# Patient Record
Sex: MALE | Race: BLACK OR AFRICAN AMERICAN | NOT HISPANIC OR LATINO | Employment: FULL TIME | URBAN - METROPOLITAN AREA
[De-identification: names, ages, dates, MRNs, and addresses within clinical notes are randomized per-mention and may not be internally consistent; named-entity substitution may affect disease eponyms.]

---

## 2018-05-02 ENCOUNTER — HISTORICAL (OUTPATIENT)
Dept: ADMINISTRATIVE | Facility: HOSPITAL | Age: 58
End: 2018-05-02

## 2018-05-02 LAB
ABS NEUT (OLG): 2.89 X10(3)/MCL (ref 2.1–9.2)
ALBUMIN SERPL-MCNC: 3.6 GM/DL (ref 3.4–5)
ALBUMIN/GLOB SERPL: 1 RATIO (ref 1–2)
ALP SERPL-CCNC: 56 UNIT/L (ref 45–117)
ALT SERPL-CCNC: 48 UNIT/L (ref 12–78)
AST SERPL-CCNC: 19 UNIT/L (ref 15–37)
BASOPHILS # BLD AUTO: 0.04 X10(3)/MCL
BASOPHILS NFR BLD AUTO: 1 %
BILIRUB SERPL-MCNC: 0.3 MG/DL (ref 0.2–1)
BILIRUBIN DIRECT+TOT PNL SERPL-MCNC: 0.1 MG/DL
BILIRUBIN DIRECT+TOT PNL SERPL-MCNC: 0.2 MG/DL
BUN SERPL-MCNC: 18 MG/DL (ref 7–18)
CALCIUM SERPL-MCNC: 8.8 MG/DL (ref 8.5–10.1)
CHLORIDE SERPL-SCNC: 113 MMOL/L (ref 98–107)
CHOLEST SERPL-MCNC: 113 MG/DL
CHOLEST/HDLC SERPL: 1.9 {RATIO} (ref 0–5)
CO2 SERPL-SCNC: 29 MMOL/L (ref 21–32)
CREAT SERPL-MCNC: 1.1 MG/DL (ref 0.6–1.3)
CREAT UR-MCNC: 120 MG/DL
EOSINOPHIL # BLD AUTO: 0.35 X10(3)/MCL
EOSINOPHIL NFR BLD AUTO: 6 %
ERYTHROCYTE [DISTWIDTH] IN BLOOD BY AUTOMATED COUNT: 14.4 % (ref 11.5–14.5)
EST. AVERAGE GLUCOSE BLD GHB EST-MCNC: 108 MG/DL
GLOBULIN SER-MCNC: 3.6 GM/ML (ref 2.3–3.5)
GLUCOSE SERPL-MCNC: 82 MG/DL (ref 74–106)
HBA1C MFR BLD: 5.4 % (ref 4.2–6.3)
HCT VFR BLD AUTO: 43.7 % (ref 40–51)
HDLC SERPL-MCNC: 58 MG/DL
HGB BLD-MCNC: 16.2 GM/DL (ref 13.5–17.5)
LDLC SERPL CALC-MCNC: 39 MG/DL (ref 0–130)
LYMPHOCYTES # BLD AUTO: 2.4 X10(3)/MCL
LYMPHOCYTES NFR BLD AUTO: 38 % (ref 13–40)
MCH RBC QN AUTO: 30.3 PG (ref 26–34)
MCHC RBC AUTO-ENTMCNC: 37.1 GM/DL (ref 31–37)
MCV RBC AUTO: 81.8 FL (ref 80–100)
MICROALBUMIN UR-MCNC: <5 MG/L (ref 0–19)
MICROALBUMIN/CREAT RATIO PNL UR: NORMAL MCG/MG CR (ref 0–29)
MONOCYTES # BLD AUTO: 0.69 X10(3)/MCL
MONOCYTES NFR BLD AUTO: 11 % (ref 4–12)
NEUTROPHILS # BLD AUTO: 2.89 X10(3)/MCL
NEUTROPHILS NFR BLD AUTO: 45 X10(3)/MCL
PLATELET # BLD AUTO: 247 X10(3)/MCL (ref 130–400)
PMV BLD AUTO: 10.2 FL (ref 7.4–10.4)
POTASSIUM SERPL-SCNC: 4.1 MMOL/L (ref 3.5–5.1)
PROT SERPL-MCNC: 7.2 GM/DL (ref 6.4–8.2)
RBC # BLD AUTO: 5.34 X10(6)/MCL (ref 4.5–5.9)
SODIUM SERPL-SCNC: 144 MMOL/L (ref 136–145)
T4 FREE SERPL-MCNC: 0.79 NG/DL (ref 0.76–1.46)
TRIGL SERPL-MCNC: 81 MG/DL
TSH SERPL-ACNC: 0.67 MIU/L (ref 0.36–3.74)
VLDLC SERPL CALC-MCNC: 16 MG/DL
WBC # SPEC AUTO: 6.4 X10(3)/MCL (ref 4.5–11)

## 2019-01-18 ENCOUNTER — HISTORICAL (OUTPATIENT)
Dept: FAMILY MEDICINE | Facility: CLINIC | Age: 59
End: 2019-01-18

## 2019-01-18 LAB
ALBUMIN SERPL-MCNC: 4 GM/DL (ref 3.4–5)
ALBUMIN/GLOB SERPL: 1.03 RATIO (ref 1.1–2)
ALP SERPL-CCNC: 53 UNIT/L (ref 45–117)
ALT SERPL-CCNC: 69 UNIT/L (ref 12–78)
AST SERPL-CCNC: 33 UNIT/L (ref 15–37)
BILIRUB SERPL-MCNC: 0.3 MG/DL (ref 0.2–1)
BILIRUBIN DIRECT+TOT PNL SERPL-MCNC: 0.1 MG/DL
BILIRUBIN DIRECT+TOT PNL SERPL-MCNC: 0.2 MG/DL
BUN SERPL-MCNC: 11 MG/DL (ref 7–18)
CALCIUM SERPL-MCNC: 8.9 MG/DL (ref 8.5–10.1)
CHLORIDE SERPL-SCNC: 107 MMOL/L (ref 98–107)
CO2 SERPL-SCNC: 32 MMOL/L (ref 21–32)
CREAT SERPL-MCNC: 0.8 MG/DL (ref 0.6–1.3)
GLOBULIN SER-MCNC: 3.9 GM/ML (ref 2.3–3.5)
GLUCOSE SERPL-MCNC: 88 MG/DL (ref 74–106)
POTASSIUM SERPL-SCNC: 4.3 MMOL/L (ref 3.5–5.1)
PROT SERPL-MCNC: 7.9 GM/DL (ref 6.4–8.2)
SODIUM SERPL-SCNC: 140 MMOL/L (ref 136–145)

## 2019-06-03 ENCOUNTER — HISTORICAL (OUTPATIENT)
Dept: ADMINISTRATIVE | Facility: HOSPITAL | Age: 59
End: 2019-06-03

## 2019-06-03 LAB
ABS NEUT (OLG): 3.04 X10(3)/MCL (ref 2.1–9.2)
ALBUMIN SERPL-MCNC: 3.8 GM/DL (ref 3.4–5)
ALBUMIN/GLOB SERPL: 1.1 RATIO (ref 1.1–2)
ALP SERPL-CCNC: 58 UNIT/L (ref 45–117)
ALT SERPL-CCNC: 43 UNIT/L (ref 12–78)
AST SERPL-CCNC: 23 UNIT/L (ref 15–37)
BASOPHILS # BLD AUTO: 0.04 X10(3)/MCL
BASOPHILS NFR BLD AUTO: 1 %
BILIRUB SERPL-MCNC: 0.3 MG/DL (ref 0.2–1)
BILIRUBIN DIRECT+TOT PNL SERPL-MCNC: <0.1 MG/DL
BILIRUBIN DIRECT+TOT PNL SERPL-MCNC: ABNORMAL MG/DL
BUN SERPL-MCNC: 9 MG/DL (ref 7–18)
CALCIUM SERPL-MCNC: 8.7 MG/DL (ref 8.5–10.1)
CHLORIDE SERPL-SCNC: 111 MMOL/L (ref 98–107)
CHOLEST SERPL-MCNC: 144 MG/DL
CHOLEST/HDLC SERPL: 2.1 {RATIO} (ref 0–5)
CO2 SERPL-SCNC: 29 MMOL/L (ref 21–32)
CREAT SERPL-MCNC: 0.8 MG/DL (ref 0.6–1.3)
CREAT UR-MCNC: 34 MG/DL
EOSINOPHIL # BLD AUTO: 0.13 X10(3)/MCL
EOSINOPHIL NFR BLD AUTO: 2 %
ERYTHROCYTE [DISTWIDTH] IN BLOOD BY AUTOMATED COUNT: 14.1 % (ref 11.5–14.5)
EST. AVERAGE GLUCOSE BLD GHB EST-MCNC: 108 MG/DL
GLOBULIN SER-MCNC: 3.5 GM/ML (ref 2.3–3.5)
GLUCOSE SERPL-MCNC: 158 MG/DL (ref 74–106)
HBA1C MFR BLD: 5.4 % (ref 4.2–6.3)
HCT VFR BLD AUTO: 43.2 % (ref 40–51)
HDLC SERPL-MCNC: 70 MG/DL
HGB BLD-MCNC: 16.2 GM/DL (ref 13.5–17.5)
IMM GRANULOCYTES # BLD AUTO: 0.02 10*3/UL
IMM GRANULOCYTES NFR BLD AUTO: 0 %
LDLC SERPL CALC-MCNC: 63 MG/DL (ref 0–130)
LYMPHOCYTES # BLD AUTO: 2.46 X10(3)/MCL
LYMPHOCYTES NFR BLD AUTO: 39 % (ref 13–40)
MCH RBC QN AUTO: 30.5 PG (ref 26–34)
MCHC RBC AUTO-ENTMCNC: 37.5 GM/DL (ref 31–37)
MCV RBC AUTO: 81.4 FL (ref 80–100)
MICROALBUMIN UR-MCNC: <5 MG/L (ref 0–19)
MICROALBUMIN/CREAT RATIO PNL UR: NORMAL MCG/MG CR (ref 0–29)
MONOCYTES # BLD AUTO: 0.68 X10(3)/MCL
MONOCYTES NFR BLD AUTO: 11 % (ref 4–12)
NEUTROPHILS # BLD AUTO: 3.04 X10(3)/MCL
NEUTROPHILS NFR BLD AUTO: 48 X10(3)/MCL
PLATELET # BLD AUTO: 239 X10(3)/MCL (ref 130–400)
PMV BLD AUTO: 10 FL (ref 7.4–10.4)
POTASSIUM SERPL-SCNC: 3.8 MMOL/L (ref 3.5–5.1)
PROT SERPL-MCNC: 7.3 GM/DL (ref 6.4–8.2)
RBC # BLD AUTO: 5.31 X10(6)/MCL (ref 4.5–5.9)
SODIUM SERPL-SCNC: 142 MMOL/L (ref 136–145)
TRIGL SERPL-MCNC: 56 MG/DL
VLDLC SERPL CALC-MCNC: 11 MG/DL
WBC # SPEC AUTO: 6.4 X10(3)/MCL (ref 4.5–11)

## 2020-11-13 ENCOUNTER — HISTORICAL (OUTPATIENT)
Dept: ADMINISTRATIVE | Facility: HOSPITAL | Age: 60
End: 2020-11-13

## 2020-11-13 LAB
ABS NEUT (OLG): 3.09 X10(3)/MCL (ref 2.1–9.2)
ALBUMIN SERPL-MCNC: 4 GM/DL (ref 3.5–5)
ALBUMIN/GLOB SERPL: 1.2 RATIO (ref 1.1–2)
ALP SERPL-CCNC: 69 UNIT/L (ref 40–150)
ALT SERPL-CCNC: 51 UNIT/L (ref 0–55)
APPEARANCE, UA: CLEAR
AST SERPL-CCNC: 26 UNIT/L (ref 5–34)
BACTERIA #/AREA URNS AUTO: ABNORMAL /HPF
BASOPHILS # BLD AUTO: 0 X10(3)/MCL (ref 0–0.2)
BASOPHILS NFR BLD AUTO: 0 %
BILIRUB SERPL-MCNC: 0.6 MG/DL
BILIRUB UR QL STRIP: NEGATIVE
BILIRUBIN DIRECT+TOT PNL SERPL-MCNC: 0.3 MG/DL (ref 0–0.5)
BILIRUBIN DIRECT+TOT PNL SERPL-MCNC: 0.3 MG/DL (ref 0–0.8)
BUN SERPL-MCNC: 12 MG/DL (ref 8.4–25.7)
CALCIUM SERPL-MCNC: 9.5 MG/DL (ref 8.4–10.2)
CHLORIDE SERPL-SCNC: 108 MMOL/L (ref 98–107)
CHOLEST SERPL-MCNC: 124 MG/DL
CHOLEST/HDLC SERPL: 3 {RATIO} (ref 0–5)
CO2 SERPL-SCNC: 26 MMOL/L (ref 22–29)
COLOR UR: ABNORMAL
CREAT SERPL-MCNC: 0.81 MG/DL (ref 0.73–1.18)
DEPRECATED CALCIDIOL+CALCIFEROL SERPL-MC: 12.7 NG/ML (ref 30–80)
EOSINOPHIL # BLD AUTO: 0.2 X10(3)/MCL (ref 0–0.9)
EOSINOPHIL NFR BLD AUTO: 2 %
ERYTHROCYTE [DISTWIDTH] IN BLOOD BY AUTOMATED COUNT: 14.6 % (ref 11.5–14.5)
EST. AVERAGE GLUCOSE BLD GHB EST-MCNC: 105.4 MG/DL
GLOBULIN SER-MCNC: 3.4 GM/DL (ref 2.4–3.5)
GLUCOSE (UA): >1000 MG/DL
GLUCOSE SERPL-MCNC: 82 MG/DL (ref 74–100)
HBA1C MFR BLD: 5.3 %
HCT VFR BLD AUTO: 46.1 % (ref 40–51)
HDLC SERPL-MCNC: 43 MG/DL (ref 35–60)
HGB BLD-MCNC: 16.6 GM/DL (ref 13.5–17.5)
HGB UR QL STRIP: NEGATIVE
HYALINE CASTS #/AREA URNS LPF: ABNORMAL /LPF
IMM GRANULOCYTES # BLD AUTO: 0.02 10*3/UL
IMM GRANULOCYTES NFR BLD AUTO: 0 %
KETONES UR QL STRIP: NEGATIVE
LDLC SERPL CALC-MCNC: 69 MG/DL (ref 50–140)
LEUKOCYTE ESTERASE UR QL STRIP: NEGATIVE
LYMPHOCYTES # BLD AUTO: 2.2 X10(3)/MCL (ref 0.6–4.6)
LYMPHOCYTES NFR BLD AUTO: 36 %
MCH RBC QN AUTO: 29.1 PG (ref 26–34)
MCHC RBC AUTO-ENTMCNC: 36 GM/DL (ref 31–37)
MCV RBC AUTO: 80.7 FL (ref 80–100)
MONOCYTES # BLD AUTO: 0.7 X10(3)/MCL (ref 0.1–1.3)
MONOCYTES NFR BLD AUTO: 11 %
NEUTROPHILS # BLD AUTO: 3.09 X10(3)/MCL (ref 2.1–9.2)
NEUTROPHILS NFR BLD AUTO: 50 %
NITRITE UR QL STRIP: NEGATIVE
PH UR STRIP: 6 [PH] (ref 4.5–8)
PLATELET # BLD AUTO: 249 X10(3)/MCL (ref 130–400)
PMV BLD AUTO: 10 FL (ref 7.4–10.4)
POTASSIUM SERPL-SCNC: 3.6 MMOL/L (ref 3.5–5.1)
PROT SERPL-MCNC: 7.4 GM/DL (ref 6.4–8.3)
PROT UR QL STRIP: NEGATIVE
RBC # BLD AUTO: 5.71 X10(6)/MCL (ref 4.5–5.9)
RBC #/AREA URNS AUTO: ABNORMAL /HPF
SODIUM SERPL-SCNC: 142 MMOL/L (ref 136–145)
SP GR UR STRIP: 1.02 (ref 1–1.03)
SQUAMOUS #/AREA URNS LPF: ABNORMAL /LPF
TRIGL SERPL-MCNC: 61 MG/DL (ref 34–140)
TSH SERPL-ACNC: 0.65 UIU/ML (ref 0.35–4.94)
UROBILINOGEN UR STRIP-ACNC: NORMAL
VLDLC SERPL CALC-MCNC: 12 MG/DL
WBC # SPEC AUTO: 6.2 X10(3)/MCL (ref 4.5–11)
WBC #/AREA URNS AUTO: ABNORMAL /HPF

## 2021-02-25 ENCOUNTER — HISTORICAL (OUTPATIENT)
Dept: ADMINISTRATIVE | Facility: HOSPITAL | Age: 61
End: 2021-02-25

## 2021-02-25 LAB
ALBUMIN SERPL-MCNC: 4.4 GM/DL (ref 3.5–5)
ALBUMIN/GLOB SERPL: 1.5 RATIO (ref 1.1–2)
ALP SERPL-CCNC: 74 UNIT/L (ref 40–150)
ALT SERPL-CCNC: 42 UNIT/L (ref 0–55)
AST SERPL-CCNC: 21 UNIT/L (ref 5–34)
BILIRUB SERPL-MCNC: 0.6 MG/DL
BILIRUBIN DIRECT+TOT PNL SERPL-MCNC: 0.3 MG/DL (ref 0–0.5)
BILIRUBIN DIRECT+TOT PNL SERPL-MCNC: 0.3 MG/DL (ref 0–0.8)
BUN SERPL-MCNC: 12.9 MG/DL (ref 8.4–25.7)
CALCIUM SERPL-MCNC: 9.3 MG/DL (ref 8.4–10.2)
CHLORIDE SERPL-SCNC: 102 MMOL/L (ref 98–107)
CHOLEST SERPL-MCNC: 133 MG/DL
CHOLEST/HDLC SERPL: 2 {RATIO} (ref 0–5)
CO2 SERPL-SCNC: 29 MMOL/L (ref 22–29)
CREAT SERPL-MCNC: 0.8 MG/DL (ref 0.73–1.18)
DEPRECATED CALCIDIOL+CALCIFEROL SERPL-MC: 76.5 NG/ML (ref 30–80)
GLOBULIN SER-MCNC: 2.9 GM/DL (ref 2.4–3.5)
GLUCOSE SERPL-MCNC: 91 MG/DL (ref 74–100)
HDLC SERPL-MCNC: 55 MG/DL (ref 35–60)
LDLC SERPL CALC-MCNC: 67 MG/DL (ref 50–140)
POTASSIUM SERPL-SCNC: 4.5 MMOL/L (ref 3.5–5.1)
PROT SERPL-MCNC: 7.3 GM/DL (ref 6.4–8.3)
PSA SERPL-MCNC: 1.48 NG/ML
SODIUM SERPL-SCNC: 141 MMOL/L (ref 136–145)
TRIGL SERPL-MCNC: 56 MG/DL (ref 34–140)
VLDLC SERPL CALC-MCNC: 11 MG/DL

## 2022-04-10 ENCOUNTER — HISTORICAL (OUTPATIENT)
Dept: ADMINISTRATIVE | Facility: HOSPITAL | Age: 62
End: 2022-04-10

## 2022-04-11 ENCOUNTER — HISTORICAL (OUTPATIENT)
Dept: ADMINISTRATIVE | Facility: HOSPITAL | Age: 62
End: 2022-04-11

## 2022-04-28 VITALS
OXYGEN SATURATION: 97 % | HEIGHT: 62 IN | BODY MASS INDEX: 26.25 KG/M2 | DIASTOLIC BLOOD PRESSURE: 68 MMHG | SYSTOLIC BLOOD PRESSURE: 127 MMHG | WEIGHT: 142.63 LBS

## 2022-04-28 VITALS
OXYGEN SATURATION: 97 % | WEIGHT: 142.63 LBS | SYSTOLIC BLOOD PRESSURE: 127 MMHG | HEIGHT: 62 IN | DIASTOLIC BLOOD PRESSURE: 68 MMHG | BODY MASS INDEX: 26.25 KG/M2

## 2022-05-13 RX ORDER — AMLODIPINE BESYLATE 10 MG/1
10 TABLET ORAL DAILY
Qty: 30 TABLET | OUTPATIENT
Start: 2022-05-13 | End: 2023-05-13

## 2022-05-16 RX ORDER — AMLODIPINE BESYLATE 10 MG/1
10 TABLET ORAL DAILY
Qty: 30 TABLET | Refills: 0 | OUTPATIENT
Start: 2022-05-16 | End: 2023-05-16

## 2022-05-18 ENCOUNTER — OFFICE VISIT (OUTPATIENT)
Dept: FAMILY MEDICINE | Facility: CLINIC | Age: 62
End: 2022-05-18

## 2022-05-18 VITALS
HEIGHT: 62 IN | SYSTOLIC BLOOD PRESSURE: 152 MMHG | DIASTOLIC BLOOD PRESSURE: 78 MMHG | HEART RATE: 65 BPM | OXYGEN SATURATION: 96 % | RESPIRATION RATE: 16 BRPM | WEIGHT: 137.56 LBS | BODY MASS INDEX: 25.32 KG/M2 | TEMPERATURE: 98 F

## 2022-05-18 DIAGNOSIS — Z00.00 WELL ADULT EXAM: Primary | ICD-10-CM

## 2022-05-18 DIAGNOSIS — I10 ESSENTIAL HYPERTENSION: ICD-10-CM

## 2022-05-18 LAB
BASOPHILS # BLD AUTO: 0.04 X10(3)/MCL (ref 0–0.2)
BASOPHILS NFR BLD AUTO: 0.6 %
EOSINOPHIL # BLD AUTO: 0.24 X10(3)/MCL (ref 0–0.9)
EOSINOPHIL NFR BLD AUTO: 3.3 %
ERYTHROCYTE [DISTWIDTH] IN BLOOD BY AUTOMATED COUNT: 13.8 % (ref 11.5–17)
HCT VFR BLD AUTO: 43.9 % (ref 42–52)
HGB BLD-MCNC: 16.3 GM/DL (ref 14–18)
IMM GRANULOCYTES # BLD AUTO: 0.01 X10(3)/MCL (ref 0–0.02)
IMM GRANULOCYTES NFR BLD AUTO: 0.1 % (ref 0–0.43)
LYMPHOCYTES # BLD AUTO: 3.06 X10(3)/MCL (ref 0.6–4.6)
LYMPHOCYTES NFR BLD AUTO: 42.5 %
MCH RBC QN AUTO: 30.2 PG (ref 27–31)
MCHC RBC AUTO-ENTMCNC: 37.1 MG/DL (ref 33–36)
MCV RBC AUTO: 81.4 FL (ref 80–94)
MONOCYTES # BLD AUTO: 0.69 X10(3)/MCL (ref 0.1–1.3)
MONOCYTES NFR BLD AUTO: 9.6 %
NEUTROPHILS # BLD AUTO: 3.2 X10(3)/MCL (ref 2.1–9.2)
NEUTROPHILS NFR BLD AUTO: 43.9 %
NRBC BLD AUTO-RTO: 0 %
PLATELET # BLD AUTO: 251 X10(3)/MCL (ref 130–400)
PMV BLD AUTO: 10.4 FL (ref 9.4–12.4)
RBC # BLD AUTO: 5.39 X10(6)/MCL (ref 4.7–6.1)
WBC # SPEC AUTO: 7.2 X10(3)/MCL (ref 4.5–11.5)

## 2022-05-18 PROCEDURE — 85025 COMPLETE CBC W/AUTO DIFF WBC: CPT

## 2022-05-18 PROCEDURE — 99215 OFFICE O/P EST HI 40 MIN: CPT | Mod: PBBFAC

## 2022-05-18 PROCEDURE — 36415 COLL VENOUS BLD VENIPUNCTURE: CPT

## 2022-05-18 RX ORDER — ASPIRIN 81 MG/1
81 TABLET ORAL DAILY
Qty: 90 TABLET | Refills: 3 | Status: SHIPPED | OUTPATIENT
Start: 2022-05-18 | End: 2023-06-06 | Stop reason: SDUPTHER

## 2022-05-18 RX ORDER — AMLODIPINE BESYLATE 10 MG/1
10 TABLET ORAL DAILY
Qty: 90 TABLET | Refills: 3 | Status: SHIPPED | OUTPATIENT
Start: 2022-05-18 | End: 2023-05-09 | Stop reason: SDUPTHER

## 2022-05-18 RX ORDER — ATORVASTATIN CALCIUM 40 MG/1
40 TABLET, FILM COATED ORAL DAILY
Qty: 90 TABLET | Refills: 3 | Status: SHIPPED | OUTPATIENT
Start: 2022-05-18 | End: 2023-06-06 | Stop reason: SDUPTHER

## 2022-05-18 NOTE — PROGRESS NOTES
"Subjective:       Patient ID: Cristian Driscoll Jr. is a 61 y.o. male.    Chief Complaint: Well adult exam     HPI   61-year-old male with a past medical history of essential hypertension, HepC s/p tx, and hyperlipidemia, presents to clinic for wellness exam.  Patient reports he has had about a 20 pack-year history of smoking and has otherwise been compliant with his home medication management without any known side effects of his medications at this time.  Denies any decreased appetite, loss of unintentional weight, chest pain, shortness of breath, nausea/vomiting. Compliant with medication regimen.  Patient otherwise has no new complaints at today's visit.      Review of Systems   Constitutional: Negative for activity change, appetite change, chills and fever.   HENT: Negative for trouble swallowing.    Eyes: Negative for visual disturbance.   Respiratory: Negative for shortness of breath and wheezing.    Cardiovascular: Negative for chest pain and palpitations.   Gastrointestinal: Negative for abdominal pain, diarrhea, nausea and vomiting.   Endocrine: Negative for cold intolerance and heat intolerance.   Skin: Negative for color change.   Allergic/Immunologic: Negative for immunocompromised state.   Neurological: Negative for headaches.   Hematological: Negative for adenopathy.   Psychiatric/Behavioral: Negative for agitation.         Objective:       Vitals:    05/18/22 1540   BP: (!) 152/78   BP Location: Right arm   Pulse: 65   Resp: 16   Temp: 98.1 °F (36.7 °C)   SpO2: 96%   Weight: 62.4 kg (137 lb 9.1 oz)   Height: 5' 2" (1.575 m)       Physical Exam   Constitutional: He is oriented to person, place, and time.   HENT:   Head: Normocephalic and atraumatic.   Cardiovascular: Normal rate, regular rhythm, normal heart sounds and normal pulses.   Abdominal: Normal appearance.   Neurological: He is alert and oriented to person, place, and time.   Skin: Skin is warm and dry. Capillary refill takes less than 2 seconds. "       Assessment:       1. Well adult exam    2. Essential hypertension        Plan:          Follow up labs   CT low dose screening annually   colonoscopy screening - Last one 2014 and normal. Repeat in 2 years.    Discuss PSA, Will order if patient desires.   Refill amlodipine, ASA and atorvastatin today   Will follow up with his PCP on 6/20.

## 2022-06-27 ENCOUNTER — OFFICE VISIT (OUTPATIENT)
Dept: FAMILY MEDICINE | Facility: CLINIC | Age: 62
End: 2022-06-27

## 2022-06-27 VITALS
RESPIRATION RATE: 18 BRPM | HEART RATE: 72 BPM | OXYGEN SATURATION: 99 % | HEIGHT: 62 IN | SYSTOLIC BLOOD PRESSURE: 143 MMHG | DIASTOLIC BLOOD PRESSURE: 73 MMHG | WEIGHT: 137.81 LBS | BODY MASS INDEX: 25.36 KG/M2 | TEMPERATURE: 98 F

## 2022-06-27 DIAGNOSIS — Z86.19 HEPATITIS C VIRUS INFECTION RESOLVED AFTER ANTIVIRAL DRUG THERAPY: ICD-10-CM

## 2022-06-27 DIAGNOSIS — K21.9 GASTROESOPHAGEAL REFLUX DISEASE, UNSPECIFIED WHETHER ESOPHAGITIS PRESENT: ICD-10-CM

## 2022-06-27 DIAGNOSIS — F17.200 TOBACCO DEPENDENCE SYNDROME: ICD-10-CM

## 2022-06-27 DIAGNOSIS — E78.5 HYPERLIPIDEMIA, UNSPECIFIED HYPERLIPIDEMIA TYPE: ICD-10-CM

## 2022-06-27 DIAGNOSIS — I10 HYPERTENSION, UNSPECIFIED TYPE: Primary | ICD-10-CM

## 2022-06-27 DIAGNOSIS — Z23 ENCOUNTER FOR ADMINISTRATION OF VACCINE: ICD-10-CM

## 2022-06-27 DIAGNOSIS — Z00.00 HEALTHCARE MAINTENANCE: ICD-10-CM

## 2022-06-27 DIAGNOSIS — R97.20 ELEVATED PSA, LESS THAN 10 NG/ML: ICD-10-CM

## 2022-06-27 PROBLEM — M54.16 LUMBAR RADICULOPATHY: Status: ACTIVE | Noted: 2022-06-27

## 2022-06-27 LAB
ALBUMIN SERPL-MCNC: 4 GM/DL (ref 3.4–4.8)
ALBUMIN/GLOB SERPL: 1.1 RATIO (ref 1.1–2)
ALP SERPL-CCNC: 59 UNIT/L (ref 40–150)
ALT SERPL-CCNC: 94 UNIT/L (ref 0–55)
AST SERPL-CCNC: 75 UNIT/L (ref 5–34)
BILIRUBIN DIRECT+TOT PNL SERPL-MCNC: 0.5 MG/DL
BUN SERPL-MCNC: 10.6 MG/DL (ref 8.4–25.7)
CALCIUM SERPL-MCNC: 9.3 MG/DL (ref 8.8–10)
CHLORIDE SERPL-SCNC: 105 MMOL/L (ref 98–107)
CO2 SERPL-SCNC: 31 MMOL/L (ref 23–31)
CREAT SERPL-MCNC: 0.78 MG/DL (ref 0.73–1.18)
EST. AVERAGE GLUCOSE BLD GHB EST-MCNC: 105.4 MG/DL
GLOBULIN SER-MCNC: 3.5 GM/DL (ref 2.4–3.5)
GLUCOSE SERPL-MCNC: 108 MG/DL (ref 82–115)
HAV IGM SERPL QL IA: NONREACTIVE
HBA1C MFR BLD: 5.3 %
HBV CORE IGM SERPL QL IA: NONREACTIVE
HBV SURFACE AG SERPL QL IA: NONREACTIVE
HCV AB SERPL QL IA: REACTIVE
POTASSIUM SERPL-SCNC: 4.1 MMOL/L (ref 3.5–5.1)
PROT SERPL-MCNC: 7.5 GM/DL (ref 5.8–7.6)
PSA SERPL-MCNC: 8.02 NG/ML
SODIUM SERPL-SCNC: 142 MMOL/L (ref 136–145)

## 2022-06-27 PROCEDURE — 83036 HEMOGLOBIN GLYCOSYLATED A1C: CPT

## 2022-06-27 PROCEDURE — 84153 ASSAY OF PSA TOTAL: CPT

## 2022-06-27 PROCEDURE — 99214 OFFICE O/P EST MOD 30 MIN: CPT | Mod: PBBFAC

## 2022-06-27 PROCEDURE — 80074 ACUTE HEPATITIS PANEL: CPT

## 2022-06-27 PROCEDURE — 80053 COMPREHEN METABOLIC PANEL: CPT

## 2022-06-27 PROCEDURE — 36415 COLL VENOUS BLD VENIPUNCTURE: CPT

## 2022-06-27 NOTE — PROGRESS NOTES
Subjective:       Patient ID: Cristian Driscoll Jr. is a 61 y.o. male.    Chief Complaint: Here for routine follow up     HPI   62yo male PMH HTN, HLD, Hep C s/p Tx, chronic radicular low back pain, GERD, tobacco use. Presents for routine follow up for chronic issues. No C/C    HTN:   - denies HA, dizziness, vision changes  - smoking Hx; slightly above goal in clinic today, states he smoked a cigarette shortly before walking in clinic   - adherent to amlodipine 10mg (prev at 25mg but complained of HA so dose was reduced)  - watching salt intake    HLD:   - denies chest pain, SOB, palpitations, leg claudication  - adherent to atorvastatin 40  - tries not to eat fried foods often, uses low saturated fat oil     GERD, Hx of PUD:   - denies nausea, vomiting  - managed with lifestyle modificiations    Tobacco use:   - denies chest pain, SOB  - hx of 32y smoking, 5 cig per day currently    PSurgH: negative   FH: mother - colon cancer, other issues he is unsure of; father - unsure  SH: 33y smoking, 5 cig per day currently; etOH on rare occasions; marijuana since teens, not daily but frequently. Works at [employer] as . Lives in [place] with wife of approx 15y. 4 adult children   All neg     Current Outpatient Medications   Medication Sig Dispense Refill    amLODIPine (NORVASC) 10 MG tablet Take 1 tablet (10 mg total) by mouth once daily. 90 tablet 3    aspirin (ECOTRIN) 81 MG EC tablet Take 1 tablet (81 mg total) by mouth once daily. 90 tablet 3    atorvastatin (LIPITOR) 40 MG tablet Take 1 tablet (40 mg total) by mouth once daily. 90 tablet 3     No current facility-administered medications for this visit.     Review of Systems    Denies HA, dizziness, chest pain, SOB, leg claudication, nausea, vomiting, diarrhea, constipation, blood in BM, unintentional weight change, change in stool caliber  Objective:      Physical Exam    BP (!) 143/73 (BP Location: Right arm, Patient Position: Sitting, BP Method: Medium  "(Automatic))   Pulse 72   Temp 98.2 °F (36.8 °C) (Oral)   Resp 18   Ht 5' 2" (1.575 m)   Wt 62.5 kg (137 lb 12.6 oz)   SpO2 99%   BMI 25.20 kg/m²     General: pleasant, no acute distress  Neck: no carotid bruits  CVS: RRR, no murmur. PMI nondisplaced. Cap refill <3 sec, radial and dorsalis pedis pulses 2+ BL, no edema  Resp: CTA in all lung fields  GI: normoactive BS in all 4 quadrants, nonTTP     Assessment:       1. Hypertension, unspecified type    2. Hyperlipidemia, unspecified hyperlipidemia type    3. Gastroesophageal reflux disease, unspecified whether esophagitis present    4. Hepatitis C virus infection resolved after antiviral drug therapy    5. Tobacco dependence syndrome    6. Healthcare maintenance        Plan:       HTN   - cont Norvasc 10   - CMP ordered today     HLD  - cont atorv 40     GERD  - managing with lifestyle  - pt unsure why he is on ASA 81, thinks he was told years ago bc he is a smoker; denies Hx of MI  - stopping daily ASA 81    Hep C  - s/p Tx, no SHOAIB found  - Hep panel ordered     Tobacco use  - amb ref to smoking cessation placed   - LDCT pending     HCM:  - CMP ordered today   - CBC WNL 5/2022  - lipid panel WNL 2/2021  - TSH WNL 2/2020  - Vit D WNL 2/2021  - A1C WNL 11/2020, no Hx of DM in family; A1C ordered   - PSA WNL 2/2021, ordered   - colo WNL, repeat in 2024  - LDCT ordered 5/18/22  - smoking cessation?  - immunizations: COVID x2 1/2021, PCV 23 5/2018 due for PCV15/20 but no stock in the clinic [advised to go tot health unit or pharmacy], Tdap 9/2018    RTC in , PCP only     NEVILLE Manuel MD HOII   U  resident     "

## 2022-06-28 LAB — PATH REV: NORMAL

## 2022-08-02 ENCOUNTER — TELEPHONE (OUTPATIENT)
Dept: FAMILY MEDICINE | Facility: CLINIC | Age: 62
End: 2022-08-02

## 2022-08-02 NOTE — TELEPHONE ENCOUNTER
Spoke with the patient to relay abnormal lab results    Hep C positive; spoke with lab and no reflex was obtained  On review of previous EMR, Hep C quant undetectable in 2019, and again earlier;  pt reports having been treated    PSA elevated at 8.02 6/2022, prev 1.48 2/2021  Urology referral placed  Pt expressed understanding and all questions were answered     Spoke with the  in an effort to expedite referral    Advised to apply for Free Care    NEVILLE Manuel MD \[facility]\"" Family Medicine

## 2022-08-28 DIAGNOSIS — R97.20 ELEVATED PSA: Primary | ICD-10-CM

## 2022-09-28 ENCOUNTER — OFFICE VISIT (OUTPATIENT)
Dept: FAMILY MEDICINE | Facility: CLINIC | Age: 62
End: 2022-09-28

## 2022-09-28 VITALS
SYSTOLIC BLOOD PRESSURE: 138 MMHG | WEIGHT: 139.75 LBS | OXYGEN SATURATION: 96 % | TEMPERATURE: 98 F | HEART RATE: 65 BPM | HEIGHT: 62 IN | BODY MASS INDEX: 25.72 KG/M2 | DIASTOLIC BLOOD PRESSURE: 80 MMHG

## 2022-09-28 DIAGNOSIS — Z72.0 TOBACCO USE: ICD-10-CM

## 2022-09-28 DIAGNOSIS — I10 PRIMARY HYPERTENSION: ICD-10-CM

## 2022-09-28 DIAGNOSIS — Z86.19 HEPATITIS C VIRUS INFECTION RESOLVED AFTER ANTIVIRAL DRUG THERAPY: Primary | ICD-10-CM

## 2022-09-28 DIAGNOSIS — Z00.00 HEALTHCARE MAINTENANCE: ICD-10-CM

## 2022-09-28 DIAGNOSIS — R97.20 ELEVATED PSA: ICD-10-CM

## 2022-09-28 PROCEDURE — 99214 OFFICE O/P EST MOD 30 MIN: CPT | Mod: PBBFAC

## 2022-09-28 NOTE — PROGRESS NOTES
"[facility] Office Visit Note    Subjective:       Patient ID: Cristian Driscoll Jr. is a 62 y.o. male.    Chief Complaint: Hypertension (Hx: HTN, HLD)      HPI:  62 y.o. male presents to [facility] clinic for HTN f/u and questions about PCV vaccine. No acute complaints today    Elevated PSA  - was wnl at 1.48 in 2021; repeat 3 mos ago with significant rise to 8.02; pt was notified of rise with plan to refer to urology  - had several concerns regarding affording referral as pt is currently self pay; Has gone to financial assistance and previously denied freecare; did not f/u with financial assistance regarding any other assistance available  - reports he is about to get insurance coverage starting 11/1/22    HTN:   - denies HA, dizziness, vision changes  - initially slightly above goal in clinic today, states he smoked a cigarette shortly before walking in clinic; repeat closer to goal  - adherent to amlodipine 10mg (prev at 25mg but complained of HA so dose was reduced)  - watching salt intake    Tobacco use:   - denies chest pain, SOB  - smokes <1 ppd. Used to smoke multiple ppd a day; working on tapering down; will smoke mostly in assoc with mealtime    HCM  - was told he would need PCV 15/20, unsure if he ever received it    Review of Systems:  Gen: denies fever and chills  Resp: denies cough, shortness of breath and wheezing  CV: denies chest pain and palpitations  GI: denies nausea, vomiting, abdominal pain and change in bowel habits    Objective:      BP (!) 145/75 (BP Location: Left arm, Patient Position: Sitting, BP Method: Medium (Automatic))   Pulse 65   Temp 98.1 °F (36.7 °C) (Oral)   Ht 5' 2" (1.575 m)   Wt 63.4 kg (139 lb 12.4 oz)   SpO2 96%   BMI 25.56 kg/m²     Physical Exam:  Gen: alert and oriented, NAD  CV: RRR, S1 and S2 present, no murmurs  Resp: CTA bilaterally, breathing nonlabored on room air  Abd: Soft, non-distended, non-tender, bowel sounds normoactive      Current " Outpatient Medications:     amLODIPine (NORVASC) 10 MG tablet, Take 1 tablet (10 mg total) by mouth once daily., Disp: 90 tablet, Rfl: 3    aspirin (ECOTRIN) 81 MG EC tablet, Take 1 tablet (81 mg total) by mouth once daily., Disp: 90 tablet, Rfl: 3    atorvastatin (LIPITOR) 40 MG tablet, Take 1 tablet (40 mg total) by mouth once daily., Disp: 90 tablet, Rfl: 3        Assessment/Plan:   1. Hepatitis C virus infection resolved after antiviral drug therapy  would benefit from checking viral load given treatment hx; will defer ordering labs until pt obtains insurance benefits (anticipated 11/1/2022)    2. Elevated PSA  Encouraged pt to check with financial assistance if cost of referral was a barrier to obtaining care due to current self-pay status. Discussed lab results in office and importance of further evaluation given sharp increase. Referral checks, was placed to [facility]    3. Primary hypertension  Repeat BP closer to goal, cont current regimen    4. Tobacco use  Encouraged pt to cont tapering down. Not interested in NRT at this time    5. Healthcare maintenance  - on chart review and emr reconcialation, pt s/p PCV 20 on 6/30/2022 at the health unit;

## 2022-09-29 NOTE — PROGRESS NOTES
Date of encounter 09-28-22.  Resident's note reviewed 09-29-22.  Elevated PSA; urology evaluation pending.  Mild elevation of AST / ALT (06/27/22); recommend repeat.    Professional services provided in an outpatient primary care center affiliated with a teaching institution.

## 2022-11-21 DIAGNOSIS — R97.20 ELEVATED PSA: Primary | ICD-10-CM

## 2022-11-22 ENCOUNTER — LAB VISIT (OUTPATIENT)
Dept: LAB | Facility: HOSPITAL | Age: 62
End: 2022-11-22
Attending: STUDENT IN AN ORGANIZED HEALTH CARE EDUCATION/TRAINING PROGRAM

## 2022-11-22 DIAGNOSIS — R97.20 ELEVATED PSA: ICD-10-CM

## 2022-11-22 LAB — PSA SERPL-MCNC: 1.2 NG/ML

## 2022-11-22 PROCEDURE — 84153 ASSAY OF PSA TOTAL: CPT

## 2022-11-22 PROCEDURE — 36415 COLL VENOUS BLD VENIPUNCTURE: CPT

## 2022-12-07 ENCOUNTER — OFFICE VISIT (OUTPATIENT)
Dept: UROLOGY | Facility: CLINIC | Age: 62
End: 2022-12-07

## 2022-12-07 VITALS
DIASTOLIC BLOOD PRESSURE: 82 MMHG | OXYGEN SATURATION: 98 % | HEART RATE: 79 BPM | RESPIRATION RATE: 18 BRPM | WEIGHT: 141.63 LBS | BODY MASS INDEX: 26.06 KG/M2 | HEIGHT: 62 IN | SYSTOLIC BLOOD PRESSURE: 171 MMHG

## 2022-12-07 DIAGNOSIS — R97.20 ELEVATED PSA: ICD-10-CM

## 2022-12-07 PROCEDURE — 99214 OFFICE O/P EST MOD 30 MIN: CPT | Mod: PBBFAC | Performed by: UROLOGY

## 2022-12-07 PROCEDURE — 99203 PR OFFICE/OUTPT VISIT, NEW, LEVL III, 30-44 MIN: ICD-10-PCS | Mod: S$PBB,,, | Performed by: UROLOGY

## 2022-12-07 PROCEDURE — 99203 OFFICE O/P NEW LOW 30 MIN: CPT | Mod: S$PBB,,, | Performed by: UROLOGY

## 2022-12-07 NOTE — PROGRESS NOTES
CC:  Elevated PSA    HPI:  Cirstian Driscoll Jr. is a 62 y.o. male being seen in consultation for elevated PSA.  He had a PSA in June of 2022 which was 8.02.  The prior PSA was on 25 February 2021 and that was 1.48.  He had another repeat PSA on 22 November 2022 which was 1.20.  He denies any urinary symptoms or problems.  He has not had a BOBBI for many years.      Lab Results:  Recent Labs     11/22/22  1550   PSA 1.20     PSA:    25 February 2021:  1.48    27 June 2022:  8.02      Data Review:  Note from referring provider, Maryjo Manuel MD, dated 28 September 2022.   PSAs.  ROS:  All systems reviewed and are negative except as documented in HPI and/or Assessment and Plan.     Patient Active Problem List:     Patient Active Problem List   Diagnosis    Hepatitis C virus infection resolved after antiviral drug therapy    Gastroesophageal reflux disease    Hyperlipidemia    Hypertension    Lumbar radiculopathy    Tobacco dependence syndrome        Past Medical History:  Past Medical History:   Diagnosis Date    Hypertension     Mixed hyperlipidemia         Past Surgical History:  History reviewed. No pertinent surgical history.     Family History:  History reviewed. No pertinent family history.     Social History:  Social History     Socioeconomic History    Marital status:    Tobacco Use    Smoking status: Every Day     Types: Cigarettes    Smokeless tobacco: Former    Tobacco comments:     2 cigs daily        Allergies:  Review of patient's allergies indicates:  No Known Allergies     Objective:  Vitals:    12/07/22 1336   BP: (!) 171/82   Pulse: 79   Resp: 18     General:  Well developed, well nourished adult male in no acute distress  Abdomen: Soft, nontender, no masses  Extremities:  No clubbing, cyanosis, or edema  Neurologic:  Grossly intact  Musculoskeletal:  Normal tone  Penis:  Circumcised, no lesions  Scrotum:  No hydrocele  Testicles:  Nontender, no masses  Epididymis:  Normal without masses  Prostate  exam:  Nontender, 1+ enlarged, symmetrical, no nodules  Rectal:  Normal rectal tone    Assessment:  1. Elevated PSA  - Ambulatory referral/consult to Urology  - PSA, Total (Diagnostic); Future     Plan:  I do not know why his PSA went up so drastically and then came down to baseline again in a five month period but it is normal now.  We will watch it more closely with a repeat PSA in six months.    Follow-up:  Six months with PSA.

## 2023-02-08 ENCOUNTER — OFFICE VISIT (OUTPATIENT)
Dept: FAMILY MEDICINE | Facility: CLINIC | Age: 63
End: 2023-02-08

## 2023-02-08 ENCOUNTER — HOSPITAL ENCOUNTER (OUTPATIENT)
Dept: RADIOLOGY | Facility: HOSPITAL | Age: 63
Discharge: HOME | End: 2023-02-08
Attending: STUDENT IN AN ORGANIZED HEALTH CARE EDUCATION/TRAINING PROGRAM

## 2023-02-08 VITALS
BODY MASS INDEX: 25.95 KG/M2 | RESPIRATION RATE: 18 BRPM | HEIGHT: 62 IN | OXYGEN SATURATION: 96 % | WEIGHT: 141 LBS | DIASTOLIC BLOOD PRESSURE: 70 MMHG | TEMPERATURE: 99 F | HEART RATE: 87 BPM | SYSTOLIC BLOOD PRESSURE: 145 MMHG

## 2023-02-08 DIAGNOSIS — G89.29 CHRONIC MIDLINE LOW BACK PAIN WITH LEFT-SIDED SCIATICA: ICD-10-CM

## 2023-02-08 DIAGNOSIS — M54.42 CHRONIC MIDLINE LOW BACK PAIN WITH LEFT-SIDED SCIATICA: ICD-10-CM

## 2023-02-08 DIAGNOSIS — R05.8 NON-PRODUCTIVE COUGH: ICD-10-CM

## 2023-02-08 DIAGNOSIS — R09.81 SINUS CONGESTION: Primary | ICD-10-CM

## 2023-02-08 PROCEDURE — 99215 OFFICE O/P EST HI 40 MIN: CPT | Mod: PBBFAC

## 2023-02-08 PROCEDURE — 72110 X-RAY EXAM L-2 SPINE 4/>VWS: CPT | Mod: TC

## 2023-02-08 RX ORDER — FLUTICASONE PROPIONATE 50 MCG
1 SPRAY, SUSPENSION (ML) NASAL DAILY
Qty: 15.8 ML | Refills: 0 | Status: SHIPPED | OUTPATIENT
Start: 2023-02-08 | End: 2023-06-14

## 2023-02-08 RX ORDER — IPRATROPIUM BROMIDE 21 UG/1
1 SPRAY, METERED NASAL 2 TIMES DAILY
Qty: 30 ML | Refills: 0 | Status: SHIPPED | OUTPATIENT
Start: 2023-02-08 | End: 2023-06-14

## 2023-02-08 NOTE — PROGRESS NOTES
"[facility] OFFICE VISIT NOTE  Cristian Driscoll Jr.  [id number]  02/08/2023    Chief Complaint   Patient presents with    Back Pain    Leg Pain    Nasal Congestion       Cristian Driscoll Jr. is a 62 y.o. male  presenting to [facility] for back pain with left leg pain and cough.    Lower back pain present for greater than 1 year associated with left leg pain wrapping from posterior thigh to knee.  Denies any trauma.  Denies any numbness or tingling.  No saddle anesthesia, loss of bowel or bladder.  Taking Aleve and using topical icy hot with relief.  Denies any weakness, falls, footdrop    Cough: Reports 2 days of cough and nasal congestion associated with signs congestion.  Denies productive cough.  Denies fever, chills, vomiting.  Denies sensation of postnasal drip.  Taking Mucinex as needed with minimal relief.  Denies ear pain or sore throat    Review of Systems   Constitutional:  Negative for chills, fatigue and fever.   HENT:  Positive for congestion and sinus pressure. Negative for ear discharge, ear pain, postnasal drip, rhinorrhea, sinus pain, sneezing, sore throat and voice change.    Eyes:  Negative for discharge.   Respiratory:  Positive for cough. Negative for shortness of breath, wheezing and stridor.    Cardiovascular:  Negative for chest pain.   Gastrointestinal:  Negative for abdominal pain, diarrhea, nausea and vomiting.   Musculoskeletal:  Positive for back pain. Negative for gait problem, joint swelling, myalgias, neck pain and neck stiffness.   Skin:  Negative for wound.   Neurological:  Negative for syncope, weakness, light-headedness and headaches.     Blood pressure (!) 145/70, pulse 87, temperature 98.6 °F (37 °C), temperature source Oral, resp. rate 18, height 5' 2.01" (1.575 m), weight 64 kg (141 lb), SpO2 96 %.   Physical Exam   HENT:   Head: Normocephalic and atraumatic.   Right Ear: Tympanic membrane, external ear and ear canal normal.   Left Ear: Tympanic membrane, external ear and ear canal normal. "   Nose: Nose normal.   Mouth/Throat: Mucous membranes are moist. No oropharyngeal exudate or posterior oropharyngeal erythema. Oropharynx is clear.   Eyes: Pupils are equal, round, and reactive to light. Conjunctivae are normal.   Cardiovascular: Normal rate, regular rhythm, normal heart sounds and normal pulses.   Pulmonary/Chest: Effort normal and breath sounds normal. No stridor. No respiratory distress. He has no wheezes. He has no rhonchi. He has no rales.   Abdominal: Normal appearance.   Musculoskeletal:         General: Normal range of motion.      Comments: Ambulation without difficulty or assistance.  Tender to palpation overlying L5, S1 with para spinal muscle spasm.  Hip flexion, knee flexion, knee extension, dorsiflexion, plantar flexion 5/5.  Patellar reflexes 2+ bilaterally.  No muscle atrophy.  No saddle anesthesia   Neurological: He is alert.     Current Medications:   Current Outpatient Medications   Medication Sig Dispense Refill    amLODIPine (NORVASC) 10 MG tablet Take 1 tablet (10 mg total) by mouth once daily. 90 tablet 3    aspirin (ECOTRIN) 81 MG EC tablet Take 1 tablet (81 mg total) by mouth once daily. 90 tablet 3    atorvastatin (LIPITOR) 40 MG tablet Take 1 tablet (40 mg total) by mouth once daily. 90 tablet 3    fluticasone propionate (FLONASE) 50 mcg/actuation nasal spray 1 spray (50 mcg total) by Each Nostril route once daily. 15.8 mL 0    ipratropium (ATROVENT) 21 mcg (0.03 %) nasal spray 1 spray by Each Nostril route 2 (two) times daily. 30 mL 0     No current facility-administered medications for this visit.       Assessment:   1. Sinus congestion    2. Non-productive cough    3. Chronic midline low back pain with left-sided sciatica        Plan:  Lumbar x-ray due to midline back pain  Recommend continuing NSAID therapy   Discussed topical therapies including Aspercreme with lidocaine, Biofreeze, icy hot   Printed education for home exercise  Atrovent and Flonase nasal spray  prescribed for sinus congestion and upper respiratory cough     Return to clinic in 4 weeks w/ PCP, or sooner if needed.     Vee Perry  [facility] Resident

## 2023-02-08 NOTE — LETTER
February 8, 2023    Cristian Driscoll Jr.  [address] [address]             [facility]  Family Medicine  [facility] [facility] [address]  Phone: [phone]   February 8, 2023     Patient: Cristian Driscoll Jr.   YOB: 1960   Date of Visit: 2/8/2023       To Whom it May Concern:    Cristian Driscoll was seen in my clinic on 2/8/2023. He may return to work on 02/10/2023.    Please excuse him from any work missed.    If you have any questions or concerns, please don't hesitate to call.    Sincerely,         Abhijeet Montenegro LPN

## 2023-03-08 ENCOUNTER — OFFICE VISIT (OUTPATIENT)
Dept: FAMILY MEDICINE | Facility: CLINIC | Age: 63
End: 2023-03-08

## 2023-03-08 VITALS
TEMPERATURE: 98 F | DIASTOLIC BLOOD PRESSURE: 77 MMHG | WEIGHT: 140 LBS | BODY MASS INDEX: 25.76 KG/M2 | RESPIRATION RATE: 18 BRPM | SYSTOLIC BLOOD PRESSURE: 147 MMHG | HEART RATE: 58 BPM | HEIGHT: 62 IN | OXYGEN SATURATION: 97 %

## 2023-03-08 DIAGNOSIS — R05.9 COUGH, UNSPECIFIED TYPE: ICD-10-CM

## 2023-03-08 DIAGNOSIS — M54.50 LOW BACK PAIN WITHOUT SCIATICA, UNSPECIFIED BACK PAIN LATERALITY, UNSPECIFIED CHRONICITY: Primary | ICD-10-CM

## 2023-03-08 PROCEDURE — 99214 OFFICE O/P EST MOD 30 MIN: CPT | Mod: PBBFAC

## 2023-03-08 RX ORDER — DICLOFENAC SODIUM 10 MG/G
2 GEL TOPICAL 4 TIMES DAILY PRN
Qty: 100 G | Refills: 3 | Status: SHIPPED | OUTPATIENT
Start: 2023-03-08 | End: 2023-06-14

## 2023-03-08 RX ORDER — DEXTROMETHORPHAN HYDROBROMIDE, GUAIFENESIN 5; 100 MG/5ML; MG/5ML
1300 LIQUID ORAL EVERY 8 HOURS
Qty: 30 TABLET | Refills: 0 | Status: SHIPPED | OUTPATIENT
Start: 2023-03-08 | End: 2023-04-07

## 2023-03-08 NOTE — PROGRESS NOTES
[facility]     63 y/o male here to follow up on back pain and left leg pain and cough.     Acute issues/CC:  Back pain   -Improving  -Rates his pain 1/10 today, at his prior visit his pain was 10/10  -Has been taking Aleve PRN but wishes to try Tylenol  -Has been doing home stretching  -Completed PT in the 90's  -Lumbar XR 2/8/23 showed degenerative changes  -Denies fever, chills, recent injury to the area    Left leg pain   -Resolved with HEP and Aleve    Cough   -Resolved  with Mucinex and Flonase    ROS   See above    PE  Vitals:    03/08/23 0913   BP: (!) 147/77   Pulse: (!) 58   Resp: 18   Temp: 97.7 °F (36.5 °C)   General: Pleasant and cooperative male in no acute distress  Lungs: Clear to auscultation bilaterally   Heart: RRR  Back: No deformity. No tenderness to palpation. No lumbar discomfort with flexion, extension, or side to side rotation.     A/P   Lower back Arthritis   -Continue heat/ice as tolerated   -Trial of Tylenol arthritis PRN   -Voltaren gel  -Went over HEP     Cough  -Resolved         RTC in 1 month to address care gaps and chronic conditions

## 2023-03-08 NOTE — LETTER
March 8, 2023      [facility] [address] [place] [address]  Phone: [phone]       Patient: Cristian Driscoll   YOB: 1960  Date of Visit: 03/08/2023    To Whom It May Concern:    Jarod Driscoll  was at [facility] on 03/08/2023. The patient may return to work on 3/9/2023 restrictions. If you have any questions or concerns, or if I can be of further assistance, please do not hesitate to contact me.    Sincerely,    Jeni Quinn MA

## 2023-05-09 ENCOUNTER — TELEPHONE (OUTPATIENT)
Dept: HEPATOLOGY | Facility: HOSPITAL | Age: 63
End: 2023-05-09

## 2023-05-09 RX ORDER — AMLODIPINE BESYLATE 10 MG/1
10 TABLET ORAL DAILY
Qty: 30 TABLET | Refills: 0 | Status: SHIPPED | OUTPATIENT
Start: 2023-05-09 | End: 2023-05-11 | Stop reason: SDUPTHER

## 2023-05-09 NOTE — TELEPHONE ENCOUNTER
Pt has not been ssen in [facility] for nearly 1y with most recent labs of a similar time period    Appt sched for 5/2023    Received request for refill of amlodipine    Will Rx 1m for now and obtain labs at clinic visit and assess for electrolyte abnormalities     NEVILLE Manuel MD [facility]

## 2023-05-11 ENCOUNTER — OFFICE VISIT (OUTPATIENT)
Dept: FAMILY MEDICINE | Facility: CLINIC | Age: 63
End: 2023-05-11

## 2023-05-11 VITALS
HEART RATE: 67 BPM | WEIGHT: 139.63 LBS | TEMPERATURE: 98 F | HEIGHT: 62 IN | BODY MASS INDEX: 25.7 KG/M2 | RESPIRATION RATE: 18 BRPM | OXYGEN SATURATION: 97 % | DIASTOLIC BLOOD PRESSURE: 80 MMHG | SYSTOLIC BLOOD PRESSURE: 138 MMHG

## 2023-05-11 DIAGNOSIS — I10 PRIMARY HYPERTENSION: Primary | ICD-10-CM

## 2023-05-11 DIAGNOSIS — F17.200 TOBACCO DEPENDENCE SYNDROME: ICD-10-CM

## 2023-05-11 PROCEDURE — 99214 OFFICE O/P EST MOD 30 MIN: CPT | Mod: PBBFAC

## 2023-05-11 RX ORDER — AMLODIPINE BESYLATE 10 MG/1
10 TABLET ORAL DAILY
Qty: 90 TABLET | Refills: 0 | Status: SHIPPED | OUTPATIENT
Start: 2023-05-11 | End: 2023-06-06 | Stop reason: SDUPTHER

## 2023-05-11 NOTE — PROGRESS NOTES
Chief Complaint  Medication Refill (Amlodipine and all med needs to be refilled at [facility] [address] [facility])      History of Present Illness  Cristian Driscoll Jr. is a 62 y.o. male presents to walk in clinic for BP check    Adherent to Amlodipine  Physically demanding job, very active  Smokes 3 cig/day x 20+ year  No history of heart disease  No headaches, blurry vision, loss of vision, chest pain, sob, palpitations  Has a cough, no fever, sputum production, has a URI last week, improving now    ROS per HPI      Physical Exam    Vitals:    05/11/23 1015   BP: 138/80   Pulse: 67   Resp: 18   Temp: 98 °F (36.7 °C)     Wt Readings from Last 2 Encounters:   05/11/23 63.3 kg (139 lb 9.6 oz)   03/08/23 63.5 kg (140 lb)     Gen: NAD  Pulm: Nonlabored, CTABL  CV: RRR, no MRG, no carotid/renal bruits, no peripheral edema  MSK: no gait abnormalities    Current Outpatient Medications  Current Outpatient Medications   Medication Instructions    amLODIPine (NORVASC) 10 mg, Oral, Daily    aspirin (ECOTRIN) 81 mg, Oral, Daily    atorvastatin (LIPITOR) 40 mg, Oral, Daily    diclofenac sodium (VOLTAREN) 2 g, Topical (Top), 4 times daily PRN, Do not exceed 32 grams/day: do not to exceed 8 grams/day/single joint of upper extremities; do not to exceed 16 grams/day/single joint of lower extremities.  Please request refill of this medication from your PCP.    fluticasone propionate (FLONASE) 50 mcg, Each Nostril, Daily    ipratropium (ATROVENT) 21 mcg (0.03 %) nasal spray 1 spray, Each Nostril, 2 times daily             Assessment / Plan:  Primary hypertension  BP at goal  Recommended DASH diet, lifestyle modification  Refilled and continued current management   Recommended calling insurance for an automatic BP cuff, Rx sent to established pharmacy  ED/Return precautions provided  -     amLODIPine (NORVASC) 10 MG tablet; Take 1 tablet (10 mg total) by mouth once daily.  Dispense: 90 tablet; Refill: 0    Tobacco dependence  syndrome  Patient counseled on smoking cessation to improve health and outcomes            Follow up:    In PRN, or earlier if needed. Keep scheduled f/u with PCP 6/2023    Natasha Singh MD  [employer] [facility] PGY-2

## 2023-05-11 NOTE — PROGRESS NOTES
I have reviewed the Resident's history and physical, assessment, plan, and progress note. I agree with the findings.       Miguelangel Manuel MD  [facility]

## 2023-06-06 DIAGNOSIS — I10 PRIMARY HYPERTENSION: ICD-10-CM

## 2023-06-06 RX ORDER — AMLODIPINE BESYLATE 10 MG/1
10 TABLET ORAL DAILY
Qty: 90 TABLET | Refills: 0 | Status: SHIPPED | OUTPATIENT
Start: 2023-06-06 | End: 2023-06-14 | Stop reason: SDUPTHER

## 2023-06-06 RX ORDER — ATORVASTATIN CALCIUM 40 MG/1
40 TABLET, FILM COATED ORAL DAILY
Qty: 90 TABLET | Refills: 3 | Status: SHIPPED | OUTPATIENT
Start: 2023-06-06 | End: 2023-06-14 | Stop reason: SDUPTHER

## 2023-06-06 RX ORDER — ASPIRIN 81 MG/1
81 TABLET ORAL DAILY
Qty: 90 TABLET | Refills: 3 | Status: SHIPPED | OUTPATIENT
Start: 2023-06-06 | End: 2023-06-14 | Stop reason: SDUPTHER

## 2023-06-14 ENCOUNTER — OFFICE VISIT (OUTPATIENT)
Dept: FAMILY MEDICINE | Facility: CLINIC | Age: 63
End: 2023-06-14

## 2023-06-14 VITALS
TEMPERATURE: 98 F | HEART RATE: 69 BPM | RESPIRATION RATE: 18 BRPM | OXYGEN SATURATION: 99 % | DIASTOLIC BLOOD PRESSURE: 80 MMHG | HEIGHT: 62 IN | WEIGHT: 141 LBS | BODY MASS INDEX: 25.95 KG/M2 | SYSTOLIC BLOOD PRESSURE: 134 MMHG

## 2023-06-14 DIAGNOSIS — F17.200 TOBACCO DEPENDENCE SYNDROME: ICD-10-CM

## 2023-06-14 DIAGNOSIS — E78.5 HYPERLIPIDEMIA, UNSPECIFIED HYPERLIPIDEMIA TYPE: ICD-10-CM

## 2023-06-14 DIAGNOSIS — Z79.899 ENCOUNTER FOR MEDICATION REVIEW: Primary | ICD-10-CM

## 2023-06-14 DIAGNOSIS — I10 PRIMARY HYPERTENSION: ICD-10-CM

## 2023-06-14 DIAGNOSIS — Z76.0 MEDICATION REFILL: ICD-10-CM

## 2023-06-14 PROCEDURE — 99214 OFFICE O/P EST MOD 30 MIN: CPT | Mod: PBBFAC

## 2023-06-14 RX ORDER — AMLODIPINE BESYLATE 10 MG/1
10 TABLET ORAL DAILY
Qty: 90 TABLET | Refills: 0 | Status: SHIPPED | OUTPATIENT
Start: 2023-06-14 | End: 2023-09-12 | Stop reason: SDUPTHER

## 2023-06-14 RX ORDER — ATORVASTATIN CALCIUM 40 MG/1
40 TABLET, FILM COATED ORAL DAILY
Qty: 90 TABLET | Refills: 0 | Status: SHIPPED | OUTPATIENT
Start: 2023-06-14 | End: 2023-10-06 | Stop reason: SDUPTHER

## 2023-06-14 RX ORDER — ASPIRIN 81 MG/1
81 TABLET ORAL DAILY
Qty: 90 TABLET | Refills: 0 | Status: SHIPPED | OUTPATIENT
Start: 2023-06-14 | End: 2024-06-13

## 2023-06-14 NOTE — PROGRESS NOTES
Subjective:       Patient ID: Cristian Driscoll Jr. is a 62 y.o. male.    Chief Complaint: Here for routine follow up     HPI   60yo male PMH HTN, HLD, Hep C s/p Tx, chronic radicular low back pain, GERD, tobacco use. Presents for routine follow up for chronic issues, last seen in [facility] 5/2023. No C/C    HTN:   - above goal in clinic   - smoking Hx; denies etOH, illicit subs   - adh to amlodipine 10mg   - adh to DASH diet     HLD:   - adh to atorvastatin 40  - not following Mediterranean diet     Tobacco use:   - not ready to stop, 3 down from 5 cig per day currently  - hx of 32y smoking    PSurgH: negative   FH: mother - colon cancer, other issues he is unsure of; father - unsure  SH: 33y smoking, 5 cig per day currently; etOH on rare occasions; marijuana since teens, not daily but frequently. Works at [employer] as . Lives in [place] with wife of approx 15y. 4 adult children   All neg         Review of Systems    denies HA, dizziness, vision changes, chest pain, SOB, palpitations, leg claudication     Physical Exam    Vitals:    06/14/23 1033   BP: 134/80   Pulse: 69   Resp: 18   Temp: 98.4 °F (36.9 °C)     General: no acute distress  CVS: RRR no murmur, radial pulses 2+ BL no edema   Resp: CTA  GI:  nonTTP       Assessment:       1. Encounter for medication review    2. Medication refill    3. Primary hypertension    4. Tobacco dependence syndrome    5. Hyperlipidemia, unspecified hyperlipidemia type          Plan:         Refilled amlodipine, asa, atorv for 3m      amb ref to smoking cessation placed but pt does not recall; pt reducing on his own   LDCT ordered last visit, no call; reordered per shared decision making       - CBC WNL 5/2022  - CMP WNL other than AST and ALT 75 and 94 resp 6/2022;   - micro/Cr pt declines, will get at next visit  - lipid panel WNL 2/2021  - TSH WNL 11/2020  - Vit D WNL 2/2021  - A1C WNL 6/2022    - PSA WNL 2/2021, 8.02 6/2022; seen by [facility] 12/2022 for elevated PSA which  returned to normal 11/2022 with plans to repeat 6/2023  - Hep C reactive 6/2022; pt declines, will get quant at next visit  - HIV, RPR pt declines, will get at next visit  - colo WNL, repeat in 2024    immunizations:   COVID x2 1/2021, PCV 23 5/2018 due for PCV15/20 pt declines will get next visit, Tdap 9/2018    RTC in 3m    NEVILLE Manuel MD III  [facility]

## 2023-09-12 DIAGNOSIS — I10 PRIMARY HYPERTENSION: ICD-10-CM

## 2023-09-12 RX ORDER — AMLODIPINE BESYLATE 10 MG/1
10 TABLET ORAL DAILY
Qty: 90 TABLET | Refills: 0 | Status: SHIPPED | OUTPATIENT
Start: 2023-09-12 | End: 2023-10-06 | Stop reason: SDUPTHER

## 2023-10-06 ENCOUNTER — OFFICE VISIT (OUTPATIENT)
Dept: FAMILY MEDICINE | Facility: CLINIC | Age: 63
End: 2023-10-06

## 2023-10-06 VITALS
HEART RATE: 61 BPM | HEIGHT: 62 IN | RESPIRATION RATE: 16 BRPM | DIASTOLIC BLOOD PRESSURE: 73 MMHG | SYSTOLIC BLOOD PRESSURE: 137 MMHG | OXYGEN SATURATION: 97 % | TEMPERATURE: 98 F | WEIGHT: 145.19 LBS | BODY MASS INDEX: 26.72 KG/M2

## 2023-10-06 DIAGNOSIS — E78.5 HYPERLIPIDEMIA, UNSPECIFIED HYPERLIPIDEMIA TYPE: Primary | ICD-10-CM

## 2023-10-06 DIAGNOSIS — I10 PRIMARY HYPERTENSION: ICD-10-CM

## 2023-10-06 PROCEDURE — 99214 OFFICE O/P EST MOD 30 MIN: CPT | Mod: PBBFAC

## 2023-10-06 RX ORDER — AMLODIPINE BESYLATE 10 MG/1
10 TABLET ORAL DAILY
Qty: 90 TABLET | Refills: 0 | Status: SHIPPED | OUTPATIENT
Start: 2023-10-06 | End: 2023-10-09 | Stop reason: SDUPTHER

## 2023-10-06 RX ORDER — ATORVASTATIN CALCIUM 40 MG/1
40 TABLET, FILM COATED ORAL DAILY
Qty: 90 TABLET | Refills: 3 | Status: SHIPPED | OUTPATIENT
Start: 2023-10-06 | End: 2023-10-09 | Stop reason: SDUPTHER

## 2023-10-06 NOTE — PROGRESS NOTES
[facility] Progress Note    ID:  Cristian Driscoll Jr.   MRN:  [id number]     10/6/2023    Chief Complaint:    Chief Complaint   Patient presents with    Medication Refill     History of Present Illness:  Cristian Driscoll Jr. is a 63 y.o. male w/ PMH HTN, HLD, Hep C s/p Tx, chronic radicular low back pain, GERD, tobacco use last seen on 6/14/2023 who presents to [facility] FM clinic for medication refill. Patient has no complaints at this time and states that he is in need of his HTN and HLD medication. Patient has been out for a few days but has been using his wife's medication which is the same as his. Patient denies any fevers, chills, n/v, chest pain, or SOB    HTN:   - above goal in clinic, 137/73 at this time  - does not checks blood pressure at home regularly  - smoking Hx; denies etOH, illicit subs   - adh to amlodipine 10mg   - adh to DASH diet      HLD:   - adh to atorvastatin 40  - not following Mediterranean diet      Tobacco use:   - not ready to stop but continues to decrease cigarette use, 3 down from 5 cig per day currently after eating  - hx of 33y smoking     PSurgH: negative   FH: mother - colon cancer, other issues he is unsure of; father - unsure  SH: 32y smoking, 3 cig per day currently; etOH on rare occasions; marijuana since teens, not daily but frequently. Works at [employer] as . Lives in [place] with wife of approx 15y. 4 adult children     Medical History  Review of patient's allergies indicates:  No Known Allergies  Past Medical History:   Diagnosis Date    Hypertension     Mixed hyperlipidemia      Social Hx:  reports that he has been smoking cigarettes. He has quit using smokeless tobacco.  Social History     Tobacco Use    Smoking status: Every Day     Types: Cigarettes    Smokeless tobacco: Former    Tobacco comments:     2 cigs daily     FH: family history is not on file.  Medication List with Changes/Refills   Current Medications    ASPIRIN (ECOTRIN) 81 MG EC TABLET    Take 1 tablet  "(81 mg total) by mouth once daily.    BLOOD-GLUC,BP METER,ADULT CUFF LORENZO    1 each by Misc.(Non-Drug; Combo Route) route 2 (two) times daily.   Changed and/or Refilled Medications    Modified Medication Previous Medication    AMLODIPINE (NORVASC) 10 MG TABLET amLODIPine (NORVASC) 10 MG tablet       Take 1 tablet (10 mg total) by mouth once daily.    Take 1 tablet (10 mg total) by mouth once daily.    ATORVASTATIN (LIPITOR) 40 MG TABLET atorvastatin (LIPITOR) 40 MG tablet       Take 1 tablet (40 mg total) by mouth once daily.    Take 1 tablet (40 mg total) by mouth once daily.       Review of Systems:  ROS reviewed with patient and updated below.  Review of Systems   Constitutional:  Negative for chills and fever.   Respiratory:  Negative for shortness of breath.    Cardiovascular:  Negative for chest pain.   Gastrointestinal:  Negative for constipation, diarrhea, nausea and vomiting.      Pertinent positives and negatives as mentioned in HPI    Objective:  Vitals:    10/06/23 1506   BP: 137/73   BP Location: Right arm   Patient Position: Sitting   BP Method: Medium (Automatic)   Pulse: 61   Resp: 16   Temp: 98.1 °F (36.7 °C)   TempSrc: Oral   SpO2: 97%   Weight: 65.9 kg (145 lb 3.2 oz)   Height: 5' 2" (1.575 m)        Physical Exam  Constitutional:       General: He is not in acute distress.     Appearance: Normal appearance. He is not ill-appearing.   HENT:      Head: Normocephalic and atraumatic.      Mouth/Throat:      Mouth: Mucous membranes are moist.      Pharynx: Oropharynx is clear.   Eyes:      General: No scleral icterus.     Extraocular Movements: Extraocular movements intact.      Pupils: Pupils are equal, round, and reactive to light.   Cardiovascular:      Rate and Rhythm: Normal rate and regular rhythm.      Pulses: Normal pulses.      Heart sounds: Normal heart sounds.   Pulmonary:      Effort: Pulmonary effort is normal.      Breath sounds: Normal breath sounds. No stridor. No wheezing. "   Abdominal:      General: Abdomen is flat. Bowel sounds are normal. There is no distension.      Palpations: Abdomen is soft.      Tenderness: There is no abdominal tenderness. There is no guarding.   Musculoskeletal:         General: Normal range of motion.      Cervical back: Normal range of motion and neck supple.   Skin:     General: Skin is warm.      Coloration: Skin is not jaundiced.   Neurological:      General: No focal deficit present.      Mental Status: He is alert and oriented to person, place, and time.          Lab Results   Component Value Date     06/27/2022    K 4.1 06/27/2022    CO2 31 06/27/2022    BUN 10.6 06/27/2022    CREATININE 0.78 06/27/2022    EGFRIFAFRICA >60 06/27/2022    EGFRNONAA 105 02/25/2021    AST 75 (H) 06/27/2022    ALT 94 (H) 06/27/2022     CBC:  Lab Results   Component Value Date    WBC 7.2 05/18/2022    HGB 16.3 05/18/2022    HCT 43.9 05/18/2022    MCV 81.4 05/18/2022     05/18/2022     Lipid Panel:  Lab Results   Component Value Date    CHOL 133 02/25/2021    TRIG 56 02/25/2021    HDL 55 02/25/2021    LDL 67.00 02/25/2021     Diabetes:  Lab Results   Component Value Date    HGBA1C 5.3 06/27/2022    MICALBCREAT UNABLE TO CALC 06/03/2019     Thyroid:  Lab Results   Component Value Date    TSH 0.6513 11/13/2020       Assessment/Plan:  Cristian was seen today for medication refill.    Diagnoses and all orders for this visit:    Hyperlipidemia, unspecified hyperlipidemia type  -     atorvastatin (LIPITOR) 40 MG tablet; Take 1 tablet (40 mg total) by mouth once daily.    Primary hypertension  -     amLODIPine (NORVASC) 10 MG tablet; Take 1 tablet (10 mg total) by mouth once daily.        Health Maintenance   Topic Date Due    Colorectal Cancer Screening  Never done    Shingles Vaccine (1 of 2) Never done    Lipid Panel  02/25/2026    TETANUS VACCINE  09/12/2028    Hepatitis C Screening  Completed       amb ref to smoking cessation placed but pt does not recall; pt  reducing on his own   LDCT ordered prior visit but patient unable to obtain due to financial concerns      - CBC WNL 5/2022, Will repeat Will need repeat  - CMP WNL other than AST and ALT 75 and 94 resp 6/2022; Will need repeat  - micro/Cr pt declines, will get at next visit  - lipid panel WNL 2/2021  - TSH WNL 11/2020  - Vit D WNL 2/2021  - A1C WNL 6/2022     - PSA WNL 2/2021, 8.02 6/2022; seen by [facility] 12/2022 for elevated PSA which returned to normal 11/2022 with plans to repeat 6/2023, patient missed prior Urology appointment but amendable to obtaining repeat PSA with yearly lab work  - Hep C reactive 6/2022; pt declines   - HIV, RPR pt declines,   - Patient states he got Colonoscopy done here at [facility] less than 10 years ago but unable to find records     Patient in need of yearly blood work, but due to financial issues patient is unable to afford getting lab work     immunizations:   COVID x2 1/2021, PCV 23 5/2018 due for PCV15/20 pt declines will get next visit, Tdap 9/2018    Follow up in about 3 months (around 1/6/2024).    Future Appointments   Date Time Provider Department Center   1/2/2024  8:20 AM Dano Newton MD [facility]       Dano Newton MD  [facility], -[facility]

## 2023-10-09 ENCOUNTER — TELEPHONE (OUTPATIENT)
Dept: FAMILY MEDICINE | Facility: CLINIC | Age: 63
End: 2023-10-09

## 2023-10-09 DIAGNOSIS — I10 PRIMARY HYPERTENSION: ICD-10-CM

## 2023-10-09 DIAGNOSIS — E78.5 HYPERLIPIDEMIA, UNSPECIFIED HYPERLIPIDEMIA TYPE: ICD-10-CM

## 2023-10-09 RX ORDER — AMLODIPINE BESYLATE 10 MG/1
10 TABLET ORAL DAILY
Qty: 30 TABLET | Refills: 5 | Status: SHIPPED | OUTPATIENT
Start: 2023-10-09 | End: 2023-10-11 | Stop reason: SDUPTHER

## 2023-10-09 RX ORDER — ATORVASTATIN CALCIUM 40 MG/1
40 TABLET, FILM COATED ORAL DAILY
Qty: 30 TABLET | Refills: 5 | Status: SHIPPED | OUTPATIENT
Start: 2023-10-09 | End: 2024-01-02 | Stop reason: SDUPTHER

## 2023-10-11 DIAGNOSIS — I10 PRIMARY HYPERTENSION: ICD-10-CM

## 2023-10-11 RX ORDER — AMLODIPINE BESYLATE 10 MG/1
10 TABLET ORAL DAILY
Qty: 30 TABLET | Refills: 5 | Status: SHIPPED | OUTPATIENT
Start: 2023-10-11 | End: 2024-01-02 | Stop reason: SDUPTHER

## 2023-11-28 NOTE — PROGRESS NOTES
Date of Service: 10/6/2023    Attending Attestation: Patient discussed with resident. The chart was reviewed thoroughly including pertinent vitals, labs, imaging, medications, prior notes, and consultant/specialist recommendations.  I participated in the management of the patient, examined the patient, reviewed the summary of the plan, and was immediately available at all times throughout the encounter. Services were furnished in a primary care center located in the outpatient department of a [facility] hospital. I agree with the resident's findings and plan as documented in the resident's note.    Sylvia Pimentel MD  Attending - Family Medicine / Geriatric Medicine  [facility] [place], [facility]

## 2024-01-02 ENCOUNTER — OFFICE VISIT (OUTPATIENT)
Dept: FAMILY MEDICINE | Facility: CLINIC | Age: 64
End: 2024-01-02

## 2024-01-02 VITALS
OXYGEN SATURATION: 97 % | BODY MASS INDEX: 28.16 KG/M2 | TEMPERATURE: 98 F | RESPIRATION RATE: 20 BRPM | DIASTOLIC BLOOD PRESSURE: 71 MMHG | HEIGHT: 62 IN | SYSTOLIC BLOOD PRESSURE: 138 MMHG | HEART RATE: 64 BPM | WEIGHT: 153 LBS

## 2024-01-02 DIAGNOSIS — E78.5 HYPERLIPIDEMIA, UNSPECIFIED HYPERLIPIDEMIA TYPE: ICD-10-CM

## 2024-01-02 DIAGNOSIS — I10 PRIMARY HYPERTENSION: ICD-10-CM

## 2024-01-02 PROCEDURE — 99214 OFFICE O/P EST MOD 30 MIN: CPT | Mod: PBBFAC

## 2024-01-02 RX ORDER — AMLODIPINE BESYLATE 10 MG/1
10 TABLET ORAL DAILY
Qty: 30 TABLET | Refills: 5 | Status: SHIPPED | OUTPATIENT
Start: 2024-01-02 | End: 2024-06-30

## 2024-01-02 RX ORDER — ATORVASTATIN CALCIUM 40 MG/1
40 TABLET, FILM COATED ORAL DAILY
Qty: 30 TABLET | Refills: 5 | Status: SHIPPED | OUTPATIENT
Start: 2024-01-02 | End: 2024-06-30

## 2024-01-02 NOTE — PROGRESS NOTES
[facility] Clinic Progress Note    ID:  Cristian Driscoll Jr.   MRN:  [id number]     1/2/2024    Chief Complaint:    Chief Complaint   Patient presents with    food insecurity absent    Medication Refill     History of Present Illness:  Cristian Driscoll Jr. is a 63 y.o. male w/ PMH HTN, HLD, Hep C s/p Tx, chronic radicular low back pain, GERD, tobacco use last seen on 10/6/2023 who presents to [facility] FM clinic for medication refill    HTN:   - above goal in clinic, 138/71 at this time  - does not checks blood pressure at home regularly  - smoking Hx; denies etOH, illicit subs   - adh to amlodipine 10mg   - adh to DASH diet      HLD:   - adh to atorvastatin 40     Tobacco use:   - not ready to stop but continues to decrease cigarette use, 3 down from 5 cig per day currently after eating  - hx of 33y smoking     PSurgH: negative   FH: mother - colon cancer, other issues he is unsure of; father - unsure  SH: 32y smoking, 3 cig per day currently; etOH on rare occasions; marijuana since teens, not daily but frequently. Works at [employer] as . Lives in [place] with wife of approx 15y. 4 adult children     Medical History  Review of patient's allergies indicates:  No Known Allergies  Past Medical History:   Diagnosis Date    Hypertension     Mixed hyperlipidemia      Social Hx:  reports that he has been smoking cigarettes. He has quit using smokeless tobacco.  Social History     Tobacco Use    Smoking status: Every Day     Types: Cigarettes    Smokeless tobacco: Former    Tobacco comments:     2 cigs daily     FH: family history is not on file.    Medication List with Changes/Refills   Current Medications    ASPIRIN (ECOTRIN) 81 MG EC TABLET    Take 1 tablet (81 mg total) by mouth once daily.    BLOOD-GLUC,BP METER,ADULT CUFF LORENZO    1 each by Misc.(Non-Drug; Combo Route) route 2 (two) times daily.   Changed and/or Refilled Medications    Modified Medication Previous Medication    AMLODIPINE (NORVASC) 10 MG TABLET amLODIPine  "(NORVASC) 10 MG tablet       Take 1 tablet (10 mg total) by mouth once daily.    Take 1 tablet (10 mg total) by mouth once daily.    ATORVASTATIN (LIPITOR) 40 MG TABLET atorvastatin (LIPITOR) 40 MG tablet       Take 1 tablet (40 mg total) by mouth once daily.    Take 1 tablet (40 mg total) by mouth once daily.       Review of Systems:  ROS reviewed with patient and updated below.  Review of Systems   Constitutional:  Negative for chills and fever.   Respiratory:  Negative for shortness of breath.    Cardiovascular:  Negative for chest pain.   Gastrointestinal:  Negative for constipation, diarrhea, nausea and vomiting.       Pertinent positives and negatives as mentioned in HPI    Objective:  Vitals:    01/02/24 0833   BP: 138/71   BP Location: Right arm   Patient Position: Sitting   BP Method: Large (Automatic)   Pulse: 64   Resp: 20   Temp: 98.1 °F (36.7 °C)   TempSrc: Oral   SpO2: 97%   Weight: 69.4 kg (153 lb)   Height: 5' 2" (1.575 m)        Physical Exam  Constitutional:       General: He is not in acute distress.     Appearance: Normal appearance. He is not ill-appearing.   HENT:      Head: Normocephalic and atraumatic.      Mouth/Throat:      Mouth: Mucous membranes are moist.      Pharynx: Oropharynx is clear.   Eyes:      General: No scleral icterus.     Extraocular Movements: Extraocular movements intact.   Cardiovascular:      Rate and Rhythm: Normal rate and regular rhythm.      Pulses: Normal pulses.      Heart sounds: Normal heart sounds.   Pulmonary:      Effort: Pulmonary effort is normal.      Breath sounds: Normal breath sounds. No stridor. No wheezing.   Abdominal:      General: Abdomen is flat. Bowel sounds are normal. There is no distension.      Palpations: Abdomen is soft.      Tenderness: There is no abdominal tenderness. There is no guarding.   Musculoskeletal:         General: Normal range of motion.      Cervical back: Normal range of motion and neck supple.   Skin:     General: Skin is " warm.      Coloration: Skin is not jaundiced.   Neurological:      General: No focal deficit present.      Mental Status: He is alert.            Lab Results   Component Value Date     06/27/2022    K 4.1 06/27/2022    CO2 31 06/27/2022    BUN 10.6 06/27/2022    CREATININE 0.78 06/27/2022    EGFRIFAFRICA >60 06/27/2022    EGFRNONAA 105 02/25/2021    AST 75 (H) 06/27/2022    ALT 94 (H) 06/27/2022     CBC:  Lab Results   Component Value Date    WBC 7.2 05/18/2022    HGB 16.3 05/18/2022    HCT 43.9 05/18/2022    MCV 81.4 05/18/2022     05/18/2022     Lipid Panel:  Lab Results   Component Value Date    CHOL 133 02/25/2021    TRIG 56 02/25/2021    HDL 55 02/25/2021    LDL 67.00 02/25/2021     Diabetes:  Lab Results   Component Value Date    HGBA1C 5.3 06/27/2022    MICALBCREAT UNABLE TO CALC 06/03/2019     Thyroid:  Lab Results   Component Value Date    TSH 0.6513 11/13/2020       Assessment/Plan:  Cristian was seen today for food insecurity absent and medication refill.    Diagnoses and all orders for this visit:    Primary hypertension  -     amLODIPine (NORVASC) 10 MG tablet; Take 1 tablet (10 mg total) by mouth once daily.    Hyperlipidemia, unspecified hyperlipidemia type  -     atorvastatin (LIPITOR) 40 MG tablet; Take 1 tablet (40 mg total) by mouth once daily.        Health Maintenance   Topic Date Due    Colorectal Cancer Screening  Never done    Shingles Vaccine (1 of 2) Never done    Lipid Panel  02/25/2026    TETANUS VACCINE  09/12/2028    Hepatitis C Screening  Completed     - CBC WNL 5/2022, Will need repeat  - CMP WNL other than AST and ALT 75 and 94 resp 6/2022; Will need repeat  - lipid panel WNL 2/2021  - TSH WNL 11/2020  - Vit D WNL 2/2021  - A1C WNL 6/2022     - PSA WNL 2/2021, 8.02 6/2022; seen by [facility] 12/2022 for elevated PSA which returned to normal 11/2022 with plans to repeat 6/2023, patient missed prior Urology appointment but amendable to obtaining repeat PSA with yearly lab  work  - Hep C reactive 6/2022; pt declines   - HIV, RPR pt declines,   - Colonoscopy performed 5/15/2014, normal with repeat in 10 years 5/2024     Patient in need of yearly blood work, but due to financial issues patient is unable to afford getting lab work  Working on filling out paperwork for insurance     immunizations:   COVID x2 1/2021, PCV 23 5/2018 due for PCV15/20 pt declines will get next visit, Tdap 9/2018    Follow up in about 3 months (around 4/2/2024) for Routine visit/yearly lab work.    Future Appointments   Date Time Provider Department Center   4/8/2024  1:40 PM Dano Newton MD [facility] [place] John Newton MD  [facility], [facility]

## 2024-01-19 NOTE — PROGRESS NOTES
Faculty addendum: Patient discussed with resident. Chart was reviewed including vitals, labs, etc. Care provided reasonable and necessary. I participated in the management of the patient and was immediately available throughout the encounter. Services were furnished in a primary care center located in the outpatient department of a [facility] hospital. I agree with the resident's findings and plan as documented in the resident's note.

## 2024-02-02 ENCOUNTER — HOSPITAL ENCOUNTER (EMERGENCY)
Facility: HOSPITAL | Age: 64
Discharge: HOME | End: 2024-02-02
Attending: INTERNAL MEDICINE

## 2024-02-02 VITALS
DIASTOLIC BLOOD PRESSURE: 88 MMHG | BODY MASS INDEX: 24.61 KG/M2 | SYSTOLIC BLOOD PRESSURE: 158 MMHG | OXYGEN SATURATION: 99 % | HEIGHT: 65 IN | TEMPERATURE: 97 F | WEIGHT: 147.69 LBS | RESPIRATION RATE: 18 BRPM | HEART RATE: 65 BPM

## 2024-02-02 DIAGNOSIS — S16.1XXA STRAIN OF NECK MUSCLE, INITIAL ENCOUNTER: Primary | ICD-10-CM

## 2024-02-02 PROCEDURE — 25000003 PHARM REV CODE 250

## 2024-02-02 PROCEDURE — 99284 EMERGENCY DEPT VISIT MOD MDM: CPT

## 2024-02-02 RX ORDER — DICLOFENAC SODIUM 25 MG/1
25 TABLET, DELAYED RELEASE ORAL 3 TIMES DAILY
Qty: 21 TABLET | Refills: 0 | Status: SHIPPED | OUTPATIENT
Start: 2024-02-02 | End: 2024-02-09

## 2024-02-02 RX ORDER — METHOCARBAMOL 500 MG/1
500 TABLET, FILM COATED ORAL 3 TIMES DAILY
Qty: 21 TABLET | Refills: 0 | Status: SHIPPED | OUTPATIENT
Start: 2024-02-02 | End: 2024-02-02

## 2024-02-02 RX ORDER — METHOCARBAMOL 500 MG/1
500 TABLET, FILM COATED ORAL ONCE
Status: DISCONTINUED | OUTPATIENT
Start: 2024-02-02 | End: 2024-02-02

## 2024-02-02 RX ORDER — METHOCARBAMOL 500 MG/1
500 TABLET, FILM COATED ORAL ONCE
Status: COMPLETED | OUTPATIENT
Start: 2024-02-02 | End: 2024-02-02

## 2024-02-02 RX ORDER — METHOCARBAMOL 500 MG/1
500 TABLET, FILM COATED ORAL 3 TIMES DAILY
Qty: 21 TABLET | Refills: 0 | Status: SHIPPED | OUTPATIENT
Start: 2024-02-02 | End: 2024-02-09

## 2024-02-02 RX ORDER — DICLOFENAC SODIUM 25 MG/1
25 TABLET, DELAYED RELEASE ORAL 3 TIMES DAILY
Qty: 21 TABLET | Refills: 0 | Status: SHIPPED | OUTPATIENT
Start: 2024-02-02 | End: 2024-02-02

## 2024-02-02 RX ADMIN — METHOCARBAMOL 500 MG: 500 TABLET ORAL at 06:02

## 2024-02-02 NOTE — ED NOTES
Non traumatic neck pain onset within the past week. States he slept on sofa and may have caused his pain. Denies lifting heavy objects. Also has chronic lower back pain. Tried icy hot topical for pain and aleve.

## 2024-02-02 NOTE — ED PROVIDER NOTES
Encounter Date: 2/2/2024       History     Chief Complaint   Patient presents with    Back Pain     Lower and upper back, posterior neck pain. Denies injury or trauma     The history is provided by the patient. No  was used.     Pt is a 64 yo M who presents with c/o acute neck pain and chronic lower back pain. Neck pain onset 3 days ago. Patient fell asleep on couch and woke up with tightness to the area. Icy Hot and Motrin provides mild relief. Neck pain radiates to his axilla. Worsens with neck flexion. Lower back pain comes and goes, not bothersome at present moment. The way he loaded his truck w/trash. No fevers , chills, CP, SOB. Or any other sx at this time.     Review of patient's allergies indicates:  No Known Allergies  Past Medical History:   Diagnosis Date    Hypertension     Mixed hyperlipidemia      No past surgical history on file.  No family history on file.  Social History     Tobacco Use    Smoking status: Every Day     Types: Cigarettes    Smokeless tobacco: Former    Tobacco comments:     2 cigs daily     Review of Systems   Constitutional:  Negative for fatigue and fever.   HENT:  Negative for sore throat.    Respiratory:  Negative for shortness of breath and stridor.    Cardiovascular:  Negative for chest pain.   Gastrointestinal:  Negative for nausea.   Genitourinary:  Negative for dysuria.   Musculoskeletal:  Positive for back pain, neck pain and neck stiffness.   Skin:  Negative for rash.   Neurological:  Negative for syncope, weakness and numbness.   Hematological:  Does not bruise/bleed easily.       Physical Exam     Initial Vitals [02/02/24 0616]   BP Pulse Resp Temp SpO2   (!) 164/92 64 20 97.2 °F (36.2 °C) 99 %      MAP       --         Physical Exam    Nursing note and vitals reviewed.  Constitutional: He appears well-developed and well-nourished. He is not diaphoretic. No distress.   HENT:   Head: Normocephalic and atraumatic.   Mouth/Throat: No oropharyngeal  exudate.   Eyes: EOM are normal. Pupils are equal, round, and reactive to light.   Neck: Neck supple. No tracheal deviation present.   TTP with cervical flexion. Palpable muscle spasm over right trapezius. Able to achieve full ROM of neck movements    Cardiovascular:  Normal rate, regular rhythm and normal heart sounds.           No murmur heard.  Pulmonary/Chest: Breath sounds normal. No respiratory distress. He has no wheezes.   Musculoskeletal:      Cervical back: Neck supple.     Lymphadenopathy:     He has no cervical adenopathy.   Neurological: He is alert and oriented to person, place, and time. He has normal strength.   Skin: Skin is warm and dry.         ED Course   Procedures  Labs Reviewed - No data to display       Imaging Results    None          Medications   methocarbamoL tablet 500 mg (500 mg Oral Given 2/2/24 0651)     Medical Decision Making  Risk  Prescription drug management.              Attending Attestation:   Physician Attestation Statement for Resident:  As the supervising MD   Physician Attestation Statement: I have personally seen and examined this patient.   I agree with the above history.  -:   As the supervising MD I agree with the above PE.     As the supervising MD I agree with the above treatment, course, plan, and disposition.                    ED Course as of 02/02/24 0708   Fri Feb 02, 2024   0706 Patient tolerated Robaxin well. Will DC with NSAIDs and muscle relaxer. Encouraged PCP follow up []   0706 Given strict ED return precautions. I have spoken with the patient and/or caregivers. I have explained the patient's condition, diagnoses and treatment plan based on the information available to me at this time. I have answered the patient's and/or caregiver's questions and addressed any concerns. The patient and/or caregivers have as good an understanding of the patient's diagnosis, condition and treatment plan as can be expected at this point. The vital signs have been stable.  The patient's condition is stable and appropriate for discharge from the emergency department.      The patient will pursue further outpatient evaluation with the primary care physician or other designated or consulting physician as outlined in the discharge instructions. The patient and/or caregivers are agreeable to this plan of care and follow-up instructions have been explained in detail. The patient and/or caregivers have received these instructions in written format and have expressed an understanding of the discharge instructions. The patient and/or caregivers are aware that any significant change in condition or worsening of symptoms should prompt an immediate return to this or the closest emergency department or a call to 911.   [AH]      ED Course User Index  [AH] Brandan Johnston MD                           Clinical Impression:  Final diagnoses:  [S16.1XXA] Strain of neck muscle, initial encounter (Primary)          ED Disposition Condition    Discharge Stable          ED Prescriptions       Medication Sig Dispense Start Date End Date Auth. Provider    diclofenac (VOLTAREN) 25 MG TbEC Take 1 tablet (25 mg total) by mouth 3 (three) times daily. for 7 days 21 tablet 2/2/2024 2/9/2024 Brandan Johnston MD    methocarbamoL (ROBAXIN) 500 MG Tab Take 1 tablet (500 mg total) by mouth 3 (three) times daily. for 7 days 21 tablet 2/2/2024 2/9/2024 Brandan Johnston MD          Follow-up Information       Follow up With Specialties Details Why Contact Info    Dano Newton MD Family Medicine In 4 weeks  [address] [id number]  [phone]      [facility] Emergency Medicine  If symptoms worsen [address] [facility] [id number] [phone]             Brandan Johnston MD  Resident  02/02/24 0708       Evelio Goode MD  02/02/24 2386

## 2024-02-08 ENCOUNTER — HOSPITAL ENCOUNTER (EMERGENCY)
Facility: HOSPITAL | Age: 64
Discharge: HOME | End: 2024-02-08
Attending: EMERGENCY MEDICINE

## 2024-02-08 VITALS
OXYGEN SATURATION: 99 % | BODY MASS INDEX: 25.31 KG/M2 | RESPIRATION RATE: 16 BRPM | TEMPERATURE: 98 F | SYSTOLIC BLOOD PRESSURE: 158 MMHG | HEART RATE: 67 BPM | WEIGHT: 152.13 LBS | DIASTOLIC BLOOD PRESSURE: 82 MMHG

## 2024-02-08 DIAGNOSIS — M54.12 CERVICAL RADICULOPATHY: Primary | ICD-10-CM

## 2024-02-08 PROCEDURE — 99284 EMERGENCY DEPT VISIT MOD MDM: CPT | Mod: 25

## 2024-02-08 PROCEDURE — 96372 THER/PROPH/DIAG INJ SC/IM: CPT | Performed by: PHYSICIAN ASSISTANT

## 2024-02-08 PROCEDURE — 63600175 PHARM REV CODE 636 W HCPCS: Performed by: PHYSICIAN ASSISTANT

## 2024-02-08 RX ORDER — DEXAMETHASONE SODIUM PHOSPHATE 4 MG/ML
8 INJECTION, SOLUTION INTRA-ARTICULAR; INTRALESIONAL; INTRAMUSCULAR; INTRAVENOUS; SOFT TISSUE
Status: COMPLETED | OUTPATIENT
Start: 2024-02-08 | End: 2024-02-08

## 2024-02-08 RX ORDER — KETOROLAC TROMETHAMINE 30 MG/ML
30 INJECTION, SOLUTION INTRAMUSCULAR; INTRAVENOUS
Status: COMPLETED | OUTPATIENT
Start: 2024-02-08 | End: 2024-02-08

## 2024-02-08 RX ADMIN — DEXAMETHASONE SODIUM PHOSPHATE 8 MG: 4 INJECTION, SOLUTION INTRA-ARTICULAR; INTRALESIONAL; INTRAMUSCULAR; INTRAVENOUS; SOFT TISSUE at 09:02

## 2024-02-08 RX ADMIN — KETOROLAC TROMETHAMINE 30 MG: 30 INJECTION, SOLUTION INTRAMUSCULAR; INTRAVENOUS at 09:02

## 2024-02-08 NOTE — ED PROVIDER NOTES
Encounter Date: 2/8/2024       History     Chief Complaint   Patient presents with    Back Pain     CONTINUED UPPER BACK PAIN AFTER BEING SEEN 6 DAYS AGO IN ER.  STATES RX MEDS NOT HELPING.      63-year-old male with past medical history significant for hypertension, hyperlipidemia, smoking and lumbar radiculopathy presents to ED complaining of pain in upper back along medial border of left side of scapula.  Patient reports pain radiates into left upper arm.  Patient was seen in this ED 6 days ago with these symptoms and discharge with NSAID and muscle relaxer, but reports he is still waking up with pain.  Denies any falls or other injury prior to onset of symptoms.  Denies numbness, paresthesia, paralysis, focal weakness, incontinence, urinary retention, fever, chills, chest pain, shortness of breath.  Vital signs stable on arrival, patient in no acute distress.      Review of patient's allergies indicates:  No Known Allergies  Past Medical History:   Diagnosis Date    Hypertension     Mixed hyperlipidemia      History reviewed. No pertinent surgical history.  History reviewed. No pertinent family history.  Social History     Tobacco Use    Smoking status: Every Day     Types: Cigarettes    Smokeless tobacco: Former    Tobacco comments:     2 cigs daily     Review of Systems   All other systems reviewed and are negative.      Physical Exam     Initial Vitals [02/08/24 0832]   BP Pulse Resp Temp SpO2   (!) 158/82 67 16 97.9 °F (36.6 °C) 99 %      MAP       --         Physical Exam    Nursing note and vitals reviewed.  Constitutional: He appears well-developed and well-nourished. He is not diaphoretic. No distress.   HENT:   Head: Normocephalic and atraumatic.   Eyes: Conjunctivae and EOM are normal. Pupils are equal, round, and reactive to light.   Neck: Neck supple.   Normal range of motion.   Full passive range of motion without pain.     Cardiovascular:  Normal rate, regular rhythm, normal heart sounds and intact  distal pulses.     Exam reveals no gallop and no friction rub.       No murmur heard.  Pulmonary/Chest: Breath sounds normal. No respiratory distress. He has no wheezes. He has no rhonchi. He has no rales. He exhibits no tenderness.   Abdominal: Abdomen is soft. Bowel sounds are normal. He exhibits no distension. There is no abdominal tenderness. There is no rebound and no guarding.   Musculoskeletal:         General: No edema. Normal range of motion.      Cervical back: Full passive range of motion without pain, normal range of motion and neck supple. No rigidity. No spinous process tenderness or muscular tenderness. Normal range of motion.      Thoracic back: Tenderness (Paraspinal muscles along medial border of left scapula) present. No swelling, edema, deformity, signs of trauma, spasms or bony tenderness. Normal range of motion.     Neurological: He is alert and oriented to person, place, and time. He has normal strength. No cranial nerve deficit or sensory deficit. GCS score is 15. GCS eye subscore is 4. GCS verbal subscore is 5. GCS motor subscore is 6.   Skin: Skin is warm and dry. Capillary refill takes less than 2 seconds. No pallor.   Psychiatric: He has a normal mood and affect.         ED Course   Procedures  Labs Reviewed - No data to display       Imaging Results    None          Medications   dexAMETHasone injection 8 mg (has no administration in time range)   ketorolac injection 30 mg (has no administration in time range)     Medical Decision Making  Differential diagnosis: Includes but not limited to cervical DDD, cervical radiculopathy, thoracic DDD, back strain     ED management:  Patient given IM Decadron and Toradol prior to discharge.    ED course:  Patient has unremarkable physical exam without signs spinal cord compression and denies all red flag symptoms.  Patient denies all systemic symptoms as well.  Patient denies any injury prior to onset of symptoms, no indication for imaging at this  time.  Patient is nontoxic appearing with normal vitals, stable for discharge.  In addition to IM meds given in ED I will discharge patient with short course of Norco.  Instructed patient to contact PCP for close follow-up.  Strict ED precautions given for new or worsening symptoms and patient verbalized understanding.  All questions answered.    Amount and/or Complexity of Data Reviewed  External Data Reviewed: notes.     Details: Reviewed note from ED visit 6 days ago.                                      Clinical Impression:  Final diagnoses:  [M54.12] Cervical radiculopathy (Primary)          ED Disposition Condition    Discharge Good          ED Prescriptions    None       Follow-up Information       Follow up With Specialties Details Why Contact Info    Dano Newton MD Family Medicine Call today  [address] [facility] [address]  [phone]      [facility] Emergency Medicine  As needed, If symptoms worsen [address] [facility] [phone] [phone]             Jevon Pickard PA  02/08/24 0816

## 2024-02-16 ENCOUNTER — OFFICE VISIT (OUTPATIENT)
Dept: FAMILY MEDICINE | Facility: CLINIC | Age: 64
End: 2024-02-16

## 2024-02-16 ENCOUNTER — HOSPITAL ENCOUNTER (OUTPATIENT)
Dept: RADIOLOGY | Facility: HOSPITAL | Age: 64
Discharge: HOME | End: 2024-02-16

## 2024-02-16 VITALS
TEMPERATURE: 98 F | HEART RATE: 73 BPM | BODY MASS INDEX: 25.52 KG/M2 | OXYGEN SATURATION: 98 % | DIASTOLIC BLOOD PRESSURE: 75 MMHG | HEIGHT: 65 IN | SYSTOLIC BLOOD PRESSURE: 138 MMHG | WEIGHT: 153.19 LBS

## 2024-02-16 DIAGNOSIS — M62.838 MUSCLE SPASMS OF NECK: ICD-10-CM

## 2024-02-16 DIAGNOSIS — M62.838 MUSCLE SPASMS OF NECK: Primary | ICD-10-CM

## 2024-02-16 PROCEDURE — 72040 X-RAY EXAM NECK SPINE 2-3 VW: CPT | Mod: TC

## 2024-02-16 PROCEDURE — 99214 OFFICE O/P EST MOD 30 MIN: CPT | Mod: PBBFAC,25

## 2024-02-16 NOTE — PROGRESS NOTES
"[facility]    Subjective:      Chief Complaint: C/O NECK PAIN    HPI   Cristian Driscoll Jr. is a 63 y.o. male who presents to [facility] for neck pain that radiates down posterior left arm and around pectoral muscle. Has been using icy hot which is providing moderate short term relief. Patient was seen in ED multiple times over the pasat few weeks for this complaint. He was given an Rx for Robaxin and Naproxen which both provided no relief. Not currently taking Lipitor. Pain initially began when patient fell asleep on the couch watching movies 2 weeks ago, awoke with left-sided neck and upper back pain. Pain seems to worsen with certain movements and is worse upon waking up in the morning. Improves with movement throughout the day. Denies bowel/bladder incontinence, falls or trauma, rashes, fever, SOB, chest pain. States he has full ROM and strength of LUE.    Review of Systems  As per HPI.    Objective:     /75 (BP Location: Right arm, Patient Position: Sitting, BP Method: Large (Automatic))   Pulse 73   Temp 97.5 °F (36.4 °C) (Oral)   Ht 5' 5" (1.651 m)   Wt 69.5 kg (153 lb 3.2 oz)   SpO2 98%   BMI 25.49 kg/m²     Physical Exam:  Gen: No acute distress  CV: RRR, no murmurs. No LE edema.  Resp: CTAB, breathing non labored on room air.  Abd: Soft, non-distended, non-tender, bowel sounds normoactive.  Skin: No rashes or abnormal skin lesions, no apparent jaundice or bruising.  MSK: ambulating spontaneously without assistance. LUE full active and passive ROM, non painful throughout ROM exercises. Strength 5/5 in BUE, no sensory deficit on LUE at time of exam, no apparent muscle wasting. Left rhomboid tender to palpation, spasm in rhomboid and trapezius palpated during exam. No overlying skin changes of upper back, no apparent lesions, no muscle wasting, no bony tenderness or stepoffs.       Current Outpatient Medications:     amLODIPine (NORVASC) 10 MG tablet, Take 1 tablet (10 mg total) by mouth " once daily., Disp: 30 tablet, Rfl: 5    aspirin (ECOTRIN) 81 MG EC tablet, Take 1 tablet (81 mg total) by mouth once daily., Disp: 90 tablet, Rfl: 0    atorvastatin (LIPITOR) 40 MG tablet, Take 1 tablet (40 mg total) by mouth once daily., Disp: 30 tablet, Rfl: 5     Assessment/Plan:     Muscle spasms of neck and upper back with radiculopathy  - X-Ray Cervical Spine AP And Lateral; Future  - Consider MRI if abnormal findings on x-ray  - Informed refusal of labs due to financial difficulties  - Discussed stretching exercises, handouts provided  - Continue icy hot PRN to affected area, okay to take ibuprofen or acetaminophen PRN for breakthrough pain    Follow-up: keep scheduled f/u with PCP 4/8/24    Heber Carmichael MD   [facility] - PGY-II

## 2024-02-26 NOTE — PROGRESS NOTES
Faculty Attestation: Patient was seen in [facility] clinic. Patient was seen and examined by the resident.  I have personally reviewed History of the Present Illness , Review of Systems, PFSH , Physical Exam, Assessment and Plan documented by the resident. I was immediately available throughout the encounter. Importance of adherence to treatment recommendations discussed with patient at the time of the visit.  Reviewed C-spine series with findings of grade 1 retrolisthesis C3-C4 with decreased disc height at same level and broad cervico thoracic levoscoliosis suggested.  Recommendation made for MRI C-spine to further evaluate.  I discussed with resident and asked him to discuss the possibility of ordering an MRI with the patient.  We can explore doing the MRI at other facilities such as [facility] imaging.  Services were furnished in a primary care center in the outpatient department at a teaching hospital. I discussed the patient with the resident and agree with resident's findings and plan as documented in the resident note. Chiara Griffith MD

## 2024-02-26 NOTE — PROGRESS NOTES
Faculty Attestation: Patient was seen in [facility] Medicine clinic. Patient was seen and examined by the resident.  I have personally reviewed History of the Present Illness , Review of Systems, PFSH , Physical Exam, Assessment and Plan documented by the resident. I was immediately available throughout the encounter. Importance of adherence to treatment recommendations discussed with patient at the time of the visit.  Reviewed C-spine series with findings of grade 1 retrolisthesis C3-C4 with decreased disc height at same level and broad cervico thoracic levoscoliosis suggested.  Recommendation made for MRI C-spine to further evaluate.  I discussed with resident and asked him to discuss the possibility of ordering an MRI with the patient.  We can explore doing the MRI at other facilities such [facility].  Services were furnished in a primary care center in the outpatient department at a teaching hospital. I discussed the patient with the resident and agree with resident's findings and plan as documented in the resident note. Chiara Griffith MD

## 2024-02-26 NOTE — PROGRESS NOTES
Faculty Attestation: Patient was seen in [facility] clinic. Patient was seen and examined by the resident.  I have personally reviewed History of the Present Illness , Review of Systems, PFSH , Physical Exam, Assessment and Plan documented by the resident. I was immediately available throughout the encounter. Importance of adherence to treatment recommendations discussed with patient at the time of the visit.  Reviewed C-spine series with findings of grade 1 retrolisthesis C3-C4 with decreased disc height at same level and broad cervico thoracic levoscoliosis suggested.  Recommendation made for MRI C-spine to further evaluate.  I discussed with resident and asked him to discuss the possibility of ordering an MRI with the patient.  We can explore doing the MRI at other facilities such as [facility].  Services were furnished in a primary care center in the outpatient department at a teaching hospital. I discussed the patient with the resident and agree with resident's findings and plan as documented in the resident note. Chiara Griffith MD

## 2024-04-08 ENCOUNTER — OFFICE VISIT (OUTPATIENT)
Dept: FAMILY MEDICINE | Facility: CLINIC | Age: 64
End: 2024-04-08
Payer: COMMERCIAL

## 2024-04-08 VITALS
HEIGHT: 65 IN | SYSTOLIC BLOOD PRESSURE: 136 MMHG | HEART RATE: 73 BPM | DIASTOLIC BLOOD PRESSURE: 84 MMHG | BODY MASS INDEX: 25.52 KG/M2 | TEMPERATURE: 98 F | WEIGHT: 153.19 LBS | OXYGEN SATURATION: 98 % | RESPIRATION RATE: 18 BRPM

## 2024-04-08 DIAGNOSIS — I10 PRIMARY HYPERTENSION: ICD-10-CM

## 2024-04-08 DIAGNOSIS — E78.5 HYPERLIPIDEMIA, UNSPECIFIED HYPERLIPIDEMIA TYPE: ICD-10-CM

## 2024-04-08 PROCEDURE — 99214 OFFICE O/P EST MOD 30 MIN: CPT | Mod: PBBFAC

## 2024-04-08 RX ORDER — AMLODIPINE BESYLATE 10 MG/1
10 TABLET ORAL DAILY
Qty: 30 TABLET | Refills: 5 | Status: SHIPPED | OUTPATIENT
Start: 2024-04-08 | End: 2024-10-05

## 2024-04-08 RX ORDER — ATORVASTATIN CALCIUM 40 MG/1
40 TABLET, FILM COATED ORAL DAILY
Qty: 30 TABLET | Refills: 5 | Status: SHIPPED | OUTPATIENT
Start: 2024-04-08 | End: 2024-04-08

## 2024-04-08 NOTE — PROGRESS NOTES
[facility] Clinic Progress Note    ID:  Cristian Driscoll Jr.   MRN:  [id number]     4/8/2024    Chief Complaint:    Chief Complaint   Patient presents with    3 month f/u      History of Present Illness:  Cristian Driscoll Jr. is a 63 y.o. male w/ PMH HTN, HLD, Hep C s/p Tx, chronic radicular low back pain, GERD, tobacco use last seen on 1/2/20024 who presents to [facility] FM clinic for:     Interval history:  Patient with muscle spasms of neck and upper back w/ radiculopathy at last visit. Cervical XR with chronic G1 retrolisthesis of C3-C4. Recommendation was made to pursue MRI for further evaluation. Patient's pain in his shoulder is completely resolved and he has been doing well since last visit.    HTN:   - blood pressure initially elevated in 160s/80s but manual repeat afterwards was 136/84  - does not checks blood pressure at home regularly  - smoking Hx; denies etOH, illicit subs   - adh to amlodipine 10mg   - adh to DASH diet      HLD:   - on atorvastatin 40, will discontinue at this time, see plan for further detail     Tobacco use:   - not ready to stop decreased cigarette use, smoking 3 cigarettes a day typically after eating  - hx of 33y smoking     PSurgH: negative   FH: mother - colon cancer, other issues he is unsure of; father - unsure  SH: 33y smoking, 3 cig per day currently; etOH on rare occasions; marijuana since teens, not daily but frequently. Currently unemployed but looking at a job with the [employer]. Lives in [place] with wife of approx 15y. 4 adult children     Medical History  Review of patient's allergies indicates:  No Known Allergies  Past Medical History:   Diagnosis Date    Hypertension     Mixed hyperlipidemia      Social Hx:  reports that he has been smoking cigarettes. He has quit using smokeless tobacco.  Social History     Tobacco Use    Smoking status: Every Day     Types: Cigarettes    Smokeless tobacco: Former    Tobacco comments:     2 cigs daily     FH: family history is not on  "file.    Medication List with Changes/Refills   Current Medications    ASPIRIN (ECOTRIN) 81 MG EC TABLET    Take 1 tablet (81 mg total) by mouth once daily.   Changed and/or Refilled Medications    Modified Medication Previous Medication    AMLODIPINE (NORVASC) 10 MG TABLET amLODIPine (NORVASC) 10 MG tablet       Take 1 tablet (10 mg total) by mouth once daily.    Take 1 tablet (10 mg total) by mouth once daily.   Discontinued Medications    ATORVASTATIN (LIPITOR) 40 MG TABLET    Take 1 tablet (40 mg total) by mouth once daily.       Review of Systems:  ROS reviewed with patient and updated below.  Review of Systems   Constitutional:  Negative for chills and fever.   Respiratory:  Negative for shortness of breath.    Cardiovascular:  Negative for chest pain.   Gastrointestinal:  Negative for constipation, diarrhea, nausea and vomiting.       Pertinent positives and negatives as mentioned in HPI    Objective:  Vitals:    04/08/24 1351   BP: 136/84   Pulse: 73   Resp: 18   Temp: 97.9 °F (36.6 °C)   TempSrc: Oral   SpO2: 98%   Weight: 69.5 kg (153 lb 3.2 oz)   Height: 5' 5" (1.651 m)        Physical Exam  Constitutional:       General: He is not in acute distress.     Appearance: Normal appearance. He is not ill-appearing.   HENT:      Head: Normocephalic and atraumatic.      Mouth/Throat:      Mouth: Mucous membranes are moist.      Pharynx: Oropharynx is clear.   Eyes:      General: No scleral icterus.  Cardiovascular:      Rate and Rhythm: Normal rate and regular rhythm.      Pulses: Normal pulses.      Heart sounds: Normal heart sounds.   Pulmonary:      Effort: Pulmonary effort is normal.      Breath sounds: Normal breath sounds. No stridor. No wheezing.   Abdominal:      General: Abdomen is flat. Bowel sounds are normal. There is no distension.      Palpations: Abdomen is soft.      Tenderness: There is no abdominal tenderness. There is no guarding.   Musculoskeletal:         General: Normal range of motion.      " Cervical back: Normal range of motion and neck supple.   Skin:     General: Skin is warm.      Coloration: Skin is not jaundiced.   Neurological:      Mental Status: He is alert.            Lab Results   Component Value Date     06/27/2022    K 4.1 06/27/2022    CO2 31 06/27/2022    BUN 10.6 06/27/2022    CREATININE 0.78 06/27/2022    EGFRIFAFRICA >60 06/27/2022    EGFRNONAA 105 02/25/2021    AST 75 (H) 06/27/2022    ALT 94 (H) 06/27/2022     CBC:  Lab Results   Component Value Date    WBC 7.2 05/18/2022    HGB 16.3 05/18/2022    HCT 43.9 05/18/2022    MCV 81.4 05/18/2022     05/18/2022     Lipid Panel:  Lab Results   Component Value Date    CHOL 133 02/25/2021    TRIG 56 02/25/2021    HDL 55 02/25/2021    LDL 67.00 02/25/2021     Diabetes:  Lab Results   Component Value Date    HGBA1C 5.3 06/27/2022    MICALBCREAT UNABLE TO CALC 06/03/2019     Thyroid:  Lab Results   Component Value Date    TSH 0.6513 11/13/2020       Assessment/Plan:  Cristian was seen today for 3 month f/u .    Diagnoses and all orders for this visit:    Primary hypertension  -     amLODIPine (NORVASC) 10 MG tablet; Take 1 tablet (10 mg total) by mouth once daily.    Hyperlipidemia, unspecified hyperlipidemia type  -     Discontinue: atorvastatin (LIPITOR) 40 MG tablet; Take 1 tablet (40 mg total) by mouth once daily.        Health Maintenance   Topic Date Due    Colorectal Cancer Screening  Never done    Shingles Vaccine (1 of 2) Never done    Lipid Panel  02/25/2026    TETANUS VACCINE  09/12/2028    Hepatitis C Screening  Completed     62 yo M presenting to clinic for routine follow up    - Patient on Lipitor 40mg but with no recent lab work in setting of slight elevation in LFTs 2 years ago will hold until able to obtain lab work. Statin therapy contraindicated in setting of liver disease  - Cervical pain with radiculopathy form prior visit completely resolved  - Patient due for yearly lab work but unable to obtain 2/2 financial  issues  - Lung cancer screening ordered but never obtained 2/2 financial issues  - Normal colonoscopy to cecum 5/15/2014 with Dr. Pérez. Per prior [facility] notes Report scanned in under 11/5/2019 colonoscopy report but unable to be found. Colonoscopy due 05/2024  - Patients lab work and screening examinations currently on hold 2/2 patient without any insurance at the moment, reports will be getting insurance within the next few months if he can get job at the [employer]      Follow up in about 3 months (around 7/8/2024) for Routine visit.    Future Appointments   Date Time Provider Department Center   7/8/2024  8:00 AM Dano Newton MD [facility]       Dano Newton MD  [facility], -[facility]

## 2024-04-10 NOTE — PROGRESS NOTES
I reviewed History, PE, A/P and medical record.  Services provided in outpatient department of a teaching hospital/facility, I was immediately available.  I agree with resident, care reasonable and necessary.   I evaluated the patient with resident at time of visit, participated in key parts of H/P and management was discussed.    Chart review  [facility] notes 2015 states tx for HCV was completed and pt no longer followed in ID clinic (treatment details predate our EMR conversion in 2018), quant (-) x 2 in 2018 and 2019), no ABD US found, LEs were always nl    Pt doing well overall, h/o HTN HLD Cervical and Lumbar DJD, tobacco use  Reports has no insurance but chart states United exchange - messaged front to review    Plan  Hold lipitor until liver enzymes can be rechecked and HCV quant/US done if still elevated, would consider liver US even if labs normal ,due to h/o HCV, would also check HIV since none noted in [facility]  Needs PSA rechecked - no show  appt for his 6 month FU PSA - DR Raya's exam was benign and PSA had resolved but she wanted repeat  Agree with need to update all HM as soon as benefits allow, stress tobacco cessation        Josefina Ramos MD  [facility] - DANIEL Alonso

## 2024-04-22 ENCOUNTER — TELEPHONE (OUTPATIENT)
Dept: FAMILY MEDICINE | Facility: CLINIC | Age: 64
End: 2024-04-22
Payer: COMMERCIAL

## 2024-04-22 NOTE — TELEPHONE ENCOUNTER
"----- Message from Laura Gay sent at 4/15/2024 10:53 AM CDT -----  Not sure if I replied, but the insurance is active. I know [facility] is not in network w/ that particular plan, so if he tries to get the labs drawn here at the hospital they wont be able to do it. [employer] Exchange Plan is out of network. He is probably getting a bill for his doctors visit so must be why he thinks he is self pay.  ----- Message -----  From: Josefina Ramos MD  Sent: 4/11/2024   5:11 PM CDT  To: Laura Jayillo    So if he has private insurance can we be sure he is covered, that his united is active?  because he keeps denying important labs we need saying he doesn't  have it  Thanks  ----- Message -----  From: GayLaura  Sent: 4/10/2024   3:45 PM CDT  To: Josefina Ramos MD    He currently has [employer] - no medicaid. I ran medicaid and it states "Subscriber not found"  ----- Message -----  From: Josefina Ramos MD  Sent: 4/10/2024   6:45 AM CDT  To: Laura Jayillo; Dano Newton MD    GM  Can you please double check this one, it says united exchange but I think he is self pay without coverage (per pt), can we do the inquiry and see if he has any medicaid?  "

## 2024-07-10 ENCOUNTER — OFFICE VISIT (OUTPATIENT)
Dept: FAMILY MEDICINE | Facility: CLINIC | Age: 64
End: 2024-07-10
Payer: COMMERCIAL

## 2024-07-10 VITALS
OXYGEN SATURATION: 97 % | SYSTOLIC BLOOD PRESSURE: 140 MMHG | BODY MASS INDEX: 30.1 KG/M2 | RESPIRATION RATE: 20 BRPM | HEIGHT: 62 IN | DIASTOLIC BLOOD PRESSURE: 72 MMHG | HEART RATE: 67 BPM | WEIGHT: 163.56 LBS | TEMPERATURE: 98 F

## 2024-07-10 DIAGNOSIS — I10 PRIMARY HYPERTENSION: ICD-10-CM

## 2024-07-10 PROCEDURE — 99214 OFFICE O/P EST MOD 30 MIN: CPT | Mod: PBBFAC

## 2024-07-10 RX ORDER — AMLODIPINE BESYLATE 10 MG/1
10 TABLET ORAL DAILY
Qty: 30 TABLET | Refills: 5 | Status: SHIPPED | OUTPATIENT
Start: 2024-07-10 | End: 2025-01-06

## 2024-07-10 NOTE — PROGRESS NOTES
[facility] FM Clinic Progress Note    ID:  Cristian Driscoll Jr.   MRN:  [id number]     7/10/2024    Chief Complaint:    Chief Complaint   Patient presents with    Follow-up     3 month follow up    Medication Refill     History of Present Illness:  Cristian Driscoll Jr. is a 64 y.o. male w/ PMH HTN, HLD, Hep C s/p Tx, chronic radicular low back pain, GERD, tobacco use who presents to [facility] FM clinic for:     Interval history:  Patient last seen on 4/8/24. Has been doing well since that time. Reports the back and neck pain has practically resolved and he is currently looking for a job. Has no complaints at this time. Reports being out of his blood pressure medication for a few days.     HTN:   - blood pressure initially elevated in 164/86, has been out of his medication for past 3 days  - does not checks blood pressure at home regularly, discussed importance of checking his pressure at home  - smoking Hx; denies etOH, illicit subs   - adh to amlodipine 10mg   - adh to DASH diet      HLD:   - Holding Lipitor 40mg 2/2 being unable to obtain labs and slight elevation in LFTs 2 years ago     Tobacco use:   - not ready to stop decreased cigarette use, smoking 3 cigarettes a day typically after eating  - hx of 34y smoking    Hx of Hep C  - Notes in [employer] in 2015 state treatment was completed    PSurgH: negative   FH: mother - colon cancer, other issues he is unsure of; father - unsure  SH: 34y smoking, 3 cig per day currently; etOH on rare occasions; marijuana since teens, not daily but frequently. Currently unemployed but looking at a job with the [employer]. Lives in [place] with wife of approx 15y. 4 adult children     Medical History  Review of patient's allergies indicates:  No Known Allergies  Past Medical History:   Diagnosis Date    Hypertension     Mixed hyperlipidemia      Social History     Tobacco Use    Smoking status: Every Day     Types: Cigarettes    Smokeless tobacco: Former    Tobacco comments:     3 cigs daily      No past surgical history on file.  family history is not on file.       Medication List with Changes/Refills   Current Medications    ASPIRIN (ECOTRIN) 81 MG EC TABLET    Take 1 tablet (81 mg total) by mouth once daily.   Changed and/or Refilled Medications    Modified Medication Previous Medication    AMLODIPINE (NORVASC) 10 MG TABLET amLODIPine (NORVASC) 10 MG tablet       Take 1 tablet (10 mg total) by mouth once daily.    Take 1 tablet (10 mg total) by mouth once daily.       Review of Systems:  ROS reviewed with patient and updated below.  Review of Systems   Constitutional:  Negative for chills and fever.   Respiratory:  Negative for shortness of breath.    Cardiovascular:  Negative for chest pain.   Gastrointestinal:  Negative for constipation, diarrhea, nausea and vomiting.       Pertinent positives and negatives as mentioned in HPI    Objective:    Vitals:    07/10/24 0826   BP: (!) 164/86   Pulse: 67   Resp: 20   Temp: 98.2 °F (36.8 °C)       Physical Exam  Constitutional:       General: He is not in acute distress.     Appearance: Normal appearance. He is not ill-appearing.   HENT:      Head: Normocephalic and atraumatic.      Mouth/Throat:      Mouth: Mucous membranes are moist.      Pharynx: Oropharynx is clear.   Eyes:      General: No scleral icterus.  Cardiovascular:      Rate and Rhythm: Normal rate and regular rhythm.      Pulses: Normal pulses.      Heart sounds: Normal heart sounds.   Pulmonary:      Effort: Pulmonary effort is normal.      Breath sounds: Normal breath sounds. No stridor. No wheezing.   Abdominal:      General: Abdomen is flat. Bowel sounds are normal. There is no distension.      Palpations: Abdomen is soft.      Tenderness: There is no abdominal tenderness. There is no guarding.   Musculoskeletal:         General: Normal range of motion.      Cervical back: Normal range of motion and neck supple.   Skin:     General: Skin is warm.      Coloration: Skin is not jaundiced.    Neurological:      Mental Status: He is alert.            Lab Results   Component Value Date     06/27/2022    K 4.1 06/27/2022     06/27/2022    CO2 31 06/27/2022    BUN 10.6 06/27/2022    CREATININE 0.78 06/27/2022    EGFRIFAFRICA >60 06/27/2022    EGFRNONAA 105 02/25/2021    AST 75 (H) 06/27/2022    ALT 94 (H) 06/27/2022     CBC:  Lab Results   Component Value Date    WBC 7.2 05/18/2022    HGB 16.3 05/18/2022    HCT 43.9 05/18/2022    MCV 81.4 05/18/2022     05/18/2022     Lipid Panel:  Lab Results   Component Value Date    CHOL 133 02/25/2021    TRIG 56 02/25/2021    HDL 55 02/25/2021    LDL 67.00 02/25/2021     Diabetes:  Lab Results   Component Value Date    HGBA1C 5.3 06/27/2022    MICALBCREAT UNABLE TO CALC 06/03/2019     Thyroid:  Lab Results   Component Value Date    TSH 0.6513 11/13/2020       Assessment/Plan:  Cristian was seen today for follow-up and medication refill.    Diagnoses and all orders for this visit:    Primary hypertension  -     amLODIPine (NORVASC) 10 MG tablet; Take 1 tablet (10 mg total) by mouth once daily.        Health Maintenance   Topic Date Due    Colorectal Cancer Screening  Never done    Shingles Vaccine (1 of 2) Never done    Lipid Panel  02/25/2026    TETANUS VACCINE  09/12/2028    Hepatitis C Screening  Completed     65 yo M presenting for follow up  - Amlodipine refilled as above  - [facility] is outside of patient's insurance network and therefore patient unable to use insurance benefits, discussed at length with patient that he may want to call his insurance to see who is in network that way he will be able to get labs and screening examinations, patient voiced understanding and will look into it, but is currently happy with us here  - Due for yearly lab work and lung cancer screening but has been deferred 2/2 financial issues  - Patient due for repeat colonoscopy at this time, previous was 5/15/2014 and was normal    Follow up in about 3 months (around  10/10/2024) for Routine visit.    No future appointments.    Dano Newton MD  [employer] , -II

## 2024-10-11 ENCOUNTER — OFFICE VISIT (OUTPATIENT)
Dept: FAMILY MEDICINE | Facility: CLINIC | Age: 64
End: 2024-10-11
Payer: COMMERCIAL

## 2024-10-11 VITALS
SYSTOLIC BLOOD PRESSURE: 144 MMHG | TEMPERATURE: 98 F | BODY MASS INDEX: 27.86 KG/M2 | HEIGHT: 62 IN | DIASTOLIC BLOOD PRESSURE: 74 MMHG | OXYGEN SATURATION: 98 % | HEART RATE: 67 BPM | WEIGHT: 151.38 LBS

## 2024-10-11 DIAGNOSIS — F17.210 CIGARETTE SMOKER: ICD-10-CM

## 2024-10-11 DIAGNOSIS — I10 PRIMARY HYPERTENSION: Primary | ICD-10-CM

## 2024-10-11 DIAGNOSIS — Z12.11 COLON CANCER SCREENING: ICD-10-CM

## 2024-10-11 DIAGNOSIS — Z78.9 INFLUENZA VACCINATION ORDERED: ICD-10-CM

## 2024-10-11 PROCEDURE — 99213 OFFICE O/P EST LOW 20 MIN: CPT | Mod: PBBFAC

## 2024-10-11 RX ORDER — AMLODIPINE BESYLATE 10 MG/1
10 TABLET ORAL DAILY
Qty: 30 TABLET | Refills: 5 | Status: SHIPPED | OUTPATIENT
Start: 2024-10-11 | End: 2025-04-09

## 2024-10-11 NOTE — PROGRESS NOTES
[facility] FM Clinic Progress Note    ID:  Cristian Driscoll Jr.   MRN:  [id number]     10/11/2024    Chief Complaint:    Chief Complaint   Patient presents with    Hypertension    MED REFILLS     History of Present Illness:  Cristian Driscoll Jr. is a 64 y.o. male  w/ PMH HTN, HLD, Hep C s/p Tx, chronic radicular low back pain, GERD, tobacco use who presents to [facility] FM clinic for:     HTN:   - blood pressure initially elevated and on recheck was 144/74, has been out of his medication for past week  - does not checks blood pressure at home regularly, discussed importance of checking his pressure at home  - smoking Hx; denies etOH, illicit subs   - adh to amlodipine 10mg   - adh to DASH diet      HLD:   - Holding Lipitor 40mg 2/2 being unable to obtain labs and slight elevation in LFTs 2 years ago      Tobacco use:   - not ready to stop decreased cigarette use, smoking 3 cigarettes a day typically after eating  - hx of 34y smoking    Hx of Hep C  - Notes in [employer] in 2015 state treatment was completed     PSurgH: negative   FH: mother - colon cancer, other issues he is unsure of; father - unsure  SH: 34y smoking, 3 cig per day currently; etOH on rare occasions; marijuana since teens, not daily but frequently. Currently unemployed but looking at a job with the [employer]. Lives in [place] with wife of approx 15y. 4 adult children       Medication List with Changes/Refills   Current Medications    ASPIRIN (ECOTRIN) 81 MG EC TABLET    Take 1 tablet (81 mg total) by mouth once daily.   Changed and/or Refilled Medications    Modified Medication Previous Medication    AMLODIPINE (NORVASC) 10 MG TABLET amLODIPine (NORVASC) 10 MG tablet       Take 1 tablet (10 mg total) by mouth once daily.    Take 1 tablet (10 mg total) by mouth once daily.       Review of Systems:  ROS reviewed with patient and updated below.  Review of Systems   Constitutional:  Negative for chills and fever.   Respiratory:  Negative for shortness of breath.     Cardiovascular:  Negative for chest pain.   Gastrointestinal:  Negative for constipation, diarrhea, nausea and vomiting.       Pertinent positives and negatives as mentioned in HPI    Objective:    Vitals:    10/11/24 0811   BP: (!) 144/74   Pulse: 67   Temp: 98.4 °F (36.9 °C)       Physical Exam  Constitutional:       General: He is not in acute distress.     Appearance: Normal appearance. He is not ill-appearing.   HENT:      Head: Normocephalic and atraumatic.   Eyes:      General: No scleral icterus.  Cardiovascular:      Rate and Rhythm: Normal rate and regular rhythm.      Pulses: Normal pulses.      Heart sounds: Normal heart sounds.   Pulmonary:      Effort: Pulmonary effort is normal.      Breath sounds: No stridor. Wheezing (right lower lung field wheezing on ascultation) present.   Abdominal:      General: Abdomen is flat. Bowel sounds are normal. There is no distension.      Palpations: Abdomen is soft.      Tenderness: There is no abdominal tenderness. There is no guarding.   Musculoskeletal:         General: Normal range of motion.      Cervical back: Normal range of motion and neck supple.   Skin:     General: Skin is warm.      Coloration: Skin is not jaundiced.   Neurological:      Mental Status: He is alert.            Lab Results   Component Value Date     06/27/2022    K 4.1 06/27/2022     06/27/2022    CO2 31 06/27/2022    BUN 10.6 06/27/2022    CREATININE 0.78 06/27/2022    EGFRIFAFRICA >60 06/27/2022    EGFRNONAA 105 02/25/2021    AST 75 (H) 06/27/2022    ALT 94 (H) 06/27/2022     CBC:  Lab Results   Component Value Date    WBC 7.2 05/18/2022    HGB 16.3 05/18/2022    HCT 43.9 05/18/2022    MCV 81.4 05/18/2022     05/18/2022     Lipid Panel:  Lab Results   Component Value Date    CHOL 133 02/25/2021    TRIG 56 02/25/2021    HDL 55 02/25/2021    LDL 67.00 02/25/2021     Diabetes:  Lab Results   Component Value Date    HGBA1C 5.3 06/27/2022    MICALBCREAT UNABLE TO CALC  06/03/2019     Thyroid:  Lab Results   Component Value Date    TSH 0.6513 11/13/2020       Assessment/Plan:  Cristian was seen today for hypertension and med refills.    Diagnoses and all orders for this visit:    Colon cancer screening  -     Ambulatory referral/consult to Gastroenterology; Future    Primary hypertension  -     amLODIPine (NORVASC) 10 MG tablet; Take 1 tablet (10 mg total) by mouth once daily.    Influenza vaccination ordered  -     influenza (Flulaval, Fluzone, Fluarix) 45 mcg/0.5 mL IM vaccine (> or = 6 mo) 0.5 mL        Health Maintenance   Topic Date Due    Colorectal Cancer Screening  Never done    Shingles Vaccine (1 of 2) Never done    Lipid Panel  02/25/2026    TETANUS VACCINE  09/12/2028    Hepatitis C Screening  Completed     63 yo M here for routine visit    - [facility] is outside of patient's insurance network and therefore patient unable to use insurance benefits, discussed at length with patient that he may want to call his insurance to see who is in network that way he will be able to get labs and screening examinations, patient voiced understanding and will look into it, but is currently happy with us here  - Also discussed that open enrollment is coming up and patient may want to consider switching to ensure he can get adequate care within our [facility] system  - Due for yearly lab work and lung cancer screening but has been deferred 2/2 insurance not being accepted at [facility]  - Patient due for repeat colonoscopy at this time, previous was 5/15/2014 and was normal, referral sent to physician in [place] that accepts patient current insurnace plan  - Continue current hypertension management as per HPI  - Discussed tobacco cessation strategies    Follow up in about 3 months (around 1/11/2025) for Routine visit.    Future Appointments   Date Time Provider Department Center   12/11/2024 10:00 AM Dano Newton MD [facility] John Newton MD  [facility], [facility]-II

## 2024-10-11 NOTE — PROGRESS NOTES
Discussed with resident at time of encounter 10-11-24.  Multiple co-morbidities.  Continued tobacco use; daily amount decreasing.  No BP medication use since 10-06-24.    Resident's note reviewed 10-11-24.  Agree with assessment; plan of care appropriate.  Updated labs / screening recommended.  Professional services provided in an outpatient primary care center affiliated with a [facility] institution.

## 2024-12-10 NOTE — PROGRESS NOTES
[facility] FM Clinic Progress Note    ID:  Cristian Driscoll Jr.   MRN:  [id number]     12/11/2024    Chief Complaint:    Chief Complaint   Patient presents with    Follow-up     htn     History of Present Illness:  Cristian Driscoll Jr. is a 64 y.o. male w/ PMH HTN, HLD, Hep C s/p Tx, chronic radicular low back pain, GERD, tobacco use who presents to [facility] FM clinic for:     HTN:   - blood pressure 139/72  - does not checks blood pressure at home regularly, discussed importance of checking his pressure at home   - smoking Hx; denies etOH, illicit subs   - adh to amlodipine 10mg   - adh to DASH diet      HLD:   - Holding Lipitor 40mg 2/2 being unable to obtain labs and slight elevation in LFTs 2 years ago      Tobacco use:   - not ready to stop decreased cigarette use, smoking 3 cigarettes a day typically after eating  - hx of 33y smoking    Hx of Hep C  - Notes in [employer] in 2015 state treatment was completed     PSurgH: negative   FH: mother - colon cancer, other issues he is unsure of; father - unsure  SH: 34y smoking, 3 cig per day currently; etOH on rare occasions; marijuana since teens, not daily but frequently. Currently unemployed but looking at a job with the [employer]. Lives in [place] with wife of approx 15y. 4 adult children       Medication List with Changes/Refills   Current Medications    AMLODIPINE (NORVASC) 10 MG TABLET    Take 1 tablet (10 mg total) by mouth once daily.    ASPIRIN (ECOTRIN) 81 MG EC TABLET    Take 1 tablet (81 mg total) by mouth once daily.       Review of Systems:  Pertinent positives and negatives as mentioned in HPI    Objective:    Vitals:    12/11/24 1005   BP: 139/72   Pulse: 76   Temp: 97.9 °F (36.6 °C)       Physical Exam  Constitutional:       General: He is not in acute distress.     Appearance: Normal appearance. He is not ill-appearing.   HENT:      Head: Normocephalic and atraumatic.   Eyes:      General: No scleral icterus.  Cardiovascular:      Rate and Rhythm: Normal rate and  regular rhythm.      Pulses: Normal pulses.      Heart sounds: Normal heart sounds.   Pulmonary:      Effort: Pulmonary effort is normal.      Breath sounds: Normal breath sounds. No stridor. No wheezing.   Musculoskeletal:         General: Normal range of motion.      Cervical back: Normal range of motion and neck supple.   Skin:     General: Skin is warm.      Coloration: Skin is not jaundiced.   Neurological:      Mental Status: He is alert.             Assessment/Plan:  Cristian was seen today for follow-up.    Diagnoses and all orders for this visit:    Primary hypertension        Health Maintenance   Topic Date Due    HIV Screening  Never done    Colorectal Cancer Screening  Never done    Shingles Vaccine (1 of 2) Never done    RSV Vaccine (Age 60+ and Pregnant patients) (1 - Risk 60-74 years 1-dose series) Never done    COVID-19 Vaccine (3 - 2024-25 season) 09/01/2024    Hemoglobin A1c (Diabetic Prevention Screening)  06/27/2025    Lipid Panel  02/25/2026    TETANUS VACCINE  09/12/2028    Hepatitis C Screening  Completed    Influenza Vaccine  Completed    Pneumococcal Vaccines (Age 0-64)  Completed     65 yo M presenting for routine visit    - [facility] is outside of patient's insurance network and therefore patient unable to use insurance benefits, discussed at length with patient that he may want to call his insurance to see who is in network that way he will be able to get labs and screening examinations, patient voiced understanding and will look into it, but is currently happy with us here  - Due for yearly lab work and lung cancer screening but has been deferred 2/2 insurance not being accepted at [facility]  - Patient due for repeat colonoscopy at this time, previous was 5/15/2014 and was normal, referral sent to physician in [place] that accepts patient current insurnace plan, patient pending scheduling his appt at beginning of next year  - Continue current hypertension management as per HPI, bp at  goal  - Discussed tobacco cessation strategies    Follow up in about 3 months (around 3/11/2025) for Routine Visit.    Future Appointments   Date Time Provider Department Center   3/11/2025  1:00 PM Dano Newton MD [facility] Gavin Newton MD  [facility], [facility]

## 2024-12-11 ENCOUNTER — OFFICE VISIT (OUTPATIENT)
Dept: FAMILY MEDICINE | Facility: CLINIC | Age: 64
End: 2024-12-11

## 2024-12-11 VITALS
WEIGHT: 152 LBS | OXYGEN SATURATION: 98 % | HEIGHT: 62 IN | BODY MASS INDEX: 27.97 KG/M2 | TEMPERATURE: 98 F | SYSTOLIC BLOOD PRESSURE: 139 MMHG | DIASTOLIC BLOOD PRESSURE: 72 MMHG | HEART RATE: 76 BPM

## 2024-12-11 DIAGNOSIS — I10 PRIMARY HYPERTENSION: Primary | ICD-10-CM

## 2024-12-11 PROCEDURE — 99213 OFFICE O/P EST LOW 20 MIN: CPT | Mod: PBBFAC

## 2024-12-11 NOTE — PROGRESS NOTES
I have discussed the case with the resident and reviewed the resident's history and physical, assessment, plan, and progress note. I agree with the findings.       Miguelangel Manuel MD  [facility]

## 2025-03-11 ENCOUNTER — OFFICE VISIT (OUTPATIENT)
Dept: FAMILY MEDICINE | Facility: CLINIC | Age: 65
End: 2025-03-11

## 2025-03-11 VITALS
WEIGHT: 145.38 LBS | SYSTOLIC BLOOD PRESSURE: 138 MMHG | RESPIRATION RATE: 18 BRPM | TEMPERATURE: 98 F | BODY MASS INDEX: 26.75 KG/M2 | HEIGHT: 62 IN | HEART RATE: 68 BPM | DIASTOLIC BLOOD PRESSURE: 88 MMHG | OXYGEN SATURATION: 98 %

## 2025-03-11 DIAGNOSIS — I10 PRIMARY HYPERTENSION: ICD-10-CM

## 2025-03-11 PROCEDURE — 99213 OFFICE O/P EST LOW 20 MIN: CPT | Mod: PBBFAC

## 2025-03-11 RX ORDER — AMLODIPINE BESYLATE 10 MG/1
10 TABLET ORAL DAILY
Qty: 30 TABLET | Refills: 5 | Status: SHIPPED | OUTPATIENT
Start: 2025-03-11 | End: 2025-09-07

## 2025-03-11 NOTE — PROGRESS NOTES
I have seen the patient, reviewed the resident's history and physical, assessment, plan, and progress note. I have personally interviewed and examined the patient at bedside and: agree with the findings.     Miguelangel Manuel MD  [facility]

## 2025-03-11 NOTE — PROGRESS NOTES
[facility] FM Clinic Progress Note    ID:  Cristian Driscoll Jr.   MRN:  [id number]     3/11/2025    Chief Complaint:    Chief Complaint   Patient presents with    Follow-up     No changes since last visit     History of Present Illness:  Cristian Driscoll Jr. is a 64 y.o. male w/ PMH HTN, HLD, Hep C s/p Tx, chronic radicular low back pain, GERD, tobacco use who presents to [facility] FM clinic for:     Interval History:  Patient last seen on 12/11/2024.  Has been doing well since that time.  Still trying to find a job with a different insurance as his current insurance is not accepted at [facility].  Has no complaints and reports that he feels great.  Currently has a job that maybe it should in higher in him once he gets his vaccination records to them.  Denied any fevers, chills, nausea, vomiting, chest pain, shortness breath.    HTN:   - blood pressure 138/88  - does not checks blood pressure at home regularly, discussed importance of checking his pressure at home once again but patient reports feeling great  - smoking Hx; denies etOH, illicit subs   - adh to amlodipine 10mg   - adh to DASH diet   - being more active helping cut grass with his cousin     HLD:   - Holding Lipitor 40mg 2/2 being unable to obtain labs and slight elevation in LFTs 4 years ago      Tobacco use:   - not ready to stop, has tremendously decreased cigarette use and remains at smoking 3 cigarettes a day typically after eating  - hx of 33y smoking  - denied any unintentional weight loss, we will continue to monitor his weight and stressed the importance of lung cancer screening    Hx of Hep C  - Notes in [employer] in 2015 state treatment was completed     PSurgH: negative   FH: mother - colon cancer, other issues he is unsure of; father - unsure  SH: 33y smoking, 3 cig per day currently; etOH on rare occasions; marijuana since teens, not daily but frequently. Currently unemployed but looking at a job. Lives in [place] with wife of approx 15y. 4 adult children        Current Outpatient Medications   Medication Instructions    amLODIPine (NORVASC) 10 mg, Oral, Daily    aspirin (ECOTRIN) 81 mg, Oral, Daily       Review of Systems:  Pertinent positives and negatives as mentioned in HPI    Objective:    Vitals:    03/11/25 1303   BP: 138/88   Pulse: 68   Resp: 18   Temp: 98 °F (36.7 °C)       Physical Exam  Constitutional:       General: He is not in acute distress.     Appearance: Normal appearance. He is not ill-appearing.   HENT:      Head: Normocephalic and atraumatic.   Eyes:      General: No scleral icterus.  Cardiovascular:      Rate and Rhythm: Normal rate and regular rhythm.      Pulses: Normal pulses.      Heart sounds: Normal heart sounds.   Pulmonary:      Effort: Pulmonary effort is normal.      Breath sounds: Normal breath sounds. No stridor. No wheezing.   Musculoskeletal:         General: Normal range of motion.      Cervical back: Normal range of motion and neck supple.   Skin:     General: Skin is warm.      Coloration: Skin is not jaundiced.   Neurological:      Mental Status: He is alert.             Assessment/Plan:  Cristian was seen today for follow-up.    Diagnoses and all orders for this visit:    Primary hypertension  -     amLODIPine (NORVASC) 10 MG tablet; Take 1 tablet (10 mg total) by mouth once daily.        Health Maintenance   Topic Date Due    HIV Screening  Never done    Colorectal Cancer Screening  Never done    Shingles Vaccine (1 of 2) Never done    RSV Vaccine (Age 60+ and Pregnant patients) (1 - Risk 60-74 years 1-dose series) Never done    COVID-19 Vaccine (3 - 2024-25 season) 09/01/2024    Hemoglobin A1c (Diabetic Prevention Screening)  06/27/2025    Lipid Panel  02/25/2026    TETANUS VACCINE  09/12/2028    Hepatitis C Screening  Completed    Influenza Vaccine  Completed    Pneumococcal Vaccines (Age 50+)  Completed     63 yo M presenting for routine follow up    - [facility]'s [facility] network and therefore patient  unable to use insurance benefits, discussed at length with patient that he may want to call his insurance to see who is in network that way he will be able to get labs and screening examinations, patient voiced understanding and will look into it, but is currently happy with us here   - actively searching for new jobs that may provide a different insurance  - Due for yearly lab work and lung cancer screening but has been deferred 2/2 insurance not being accepted at [facility]  - discussed the importance of lung cancer screening especially in his case due to his extensive smoking history and current smoking status  - Patient due for repeat colonoscopy at this time, previous was 5/15/2014 and was normal, referral sent to physician in [place] that accepts patient current insurnace plan, patient pending scheduling his appt at beginning of next year but reports finding a place closer to his home that takes his insurance and we will get their information for a referral to be sent to them  - Continue current hypertension management as per HPI, bp at goal  - Discussed tobacco cessation strategies    Follow up in about 3 months (around 6/11/2025) for routine f/u.    Future Appointments   Date Time Provider Department Center   6/11/2025 10:20 AM Dano Newton MD [facility] Gavin Newton MD  [facility], [facility]

## 2025-06-25 ENCOUNTER — OFFICE VISIT (OUTPATIENT)
Dept: FAMILY MEDICINE | Facility: CLINIC | Age: 65
End: 2025-06-25

## 2025-06-25 VITALS
DIASTOLIC BLOOD PRESSURE: 71 MMHG | SYSTOLIC BLOOD PRESSURE: 127 MMHG | HEIGHT: 62 IN | BODY MASS INDEX: 26.43 KG/M2 | TEMPERATURE: 99 F | HEART RATE: 77 BPM | OXYGEN SATURATION: 96 % | RESPIRATION RATE: 20 BRPM | WEIGHT: 143.63 LBS

## 2025-06-25 DIAGNOSIS — R06.2 WHEEZING: ICD-10-CM

## 2025-06-25 DIAGNOSIS — F17.210 CIGARETTE SMOKER: ICD-10-CM

## 2025-06-25 DIAGNOSIS — I10 PRIMARY HYPERTENSION: Primary | ICD-10-CM

## 2025-06-25 PROCEDURE — 99214 OFFICE O/P EST MOD 30 MIN: CPT | Mod: PBBFAC

## 2025-06-25 RX ORDER — AMLODIPINE BESYLATE 10 MG/1
10 TABLET ORAL DAILY
Qty: 30 TABLET | Refills: 5 | Status: SHIPPED | OUTPATIENT
Start: 2025-06-25 | End: 2025-12-22

## 2025-06-25 NOTE — PROGRESS NOTES
[facility] Progress Note    ID:  Cristian Driscoll Jr.   MRN:  [id number]     6/26/2025    Chief Complaint:    Chief Complaint   Patient presents with    Follow-up     Patients states no complaints     History of Present Illness:  Cristian Driscoll Jr. is a 64 y.o. male  w/ PMH HTN, HLD, Hep C s/p Tx, chronic radicular low back pain, GERD, tobacco use who presents to [facility] FM clinic for:     Interval History:  Patient last seen on 3/11/25.  Has been doing well since that time.  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  Also discussed proper hydration while out cutting grass in the middle of the day  Denied any fevers, chills, nausea, vomiting, chest pain, shortness breath.     HTN:   - blood pressure 127/71 on repeat, initial 154/84 after patient had finished smoking a cigarette prior to coming into clinic  - does not checks blood pressure at home regularly, discussed importance of checking his pressure at home once again but patient reports feeling great   - smoking Hx; denies etOH, illicit subs   - adh to amlodipine 10mg   - adh to DASH diet   - being more active helping cut grass with his cousin     HLD:   - Holding Lipitor 40mg 2/2 being unable to obtain labs and slight elevation in LFTs 4 years ago      Tobacco use:   - not ready to stop, has tremendously decreased cigarette use and remains at smoking 3 cigarettes a day typically after eating  - hx of 33y smoking  - denied any unintentional weight loss, we will continue to monitor his weight and stressed the importance of lung cancer screening  - unable to obtain lung cancer screening at this time due to patient's insurance not being accepted and patient is self-pay    Hx of Hep C  - Notes in [facility] in 2015 state treatment was completed     PSurgH: negative   FH: mother - colon cancer, other issues he is unsure of; father - unsure  SH: 33y smoking, 3 cig per day currently; etOH on rare  occasions; marijuana since teens, not daily but frequently. Currently unemployed but looking at a job. Lives in [place] with wife of approx 15y. 4 adult children       Current Outpatient Medications   Medication Instructions    amLODIPine (NORVASC) 10 mg, Oral, Daily    aspirin (ECOTRIN) 81 mg, Oral, Daily       Review of Systems:  Pertinent positives and negatives as mentioned in HPI    Objective:    Vitals:    06/25/25 0830   BP: 127/71   Pulse:    Resp:    Temp:        Physical Exam  Constitutional:       General: He is not in acute distress.     Appearance: Normal appearance. He is not ill-appearing.   HENT:      Head: Normocephalic and atraumatic.   Eyes:      General: No scleral icterus.  Cardiovascular:      Rate and Rhythm: Normal rate and regular rhythm.      Pulses: Normal pulses.      Heart sounds: Normal heart sounds.   Pulmonary:      Effort: Pulmonary effort is normal.      Breath sounds: No stridor. Wheezing (Right lower lobe wheeze) present.   Musculoskeletal:         General: Normal range of motion.      Cervical back: Normal range of motion and neck supple.   Skin:     General: Skin is warm.      Coloration: Skin is not jaundiced.   Neurological:      Mental Status: He is alert.         Assessment/Plan:  Cristian was seen today for follow-up.    Diagnoses and all orders for this visit:    Primary hypertension  -     amLODIPine (NORVASC) 10 MG tablet; Take 1 tablet (10 mg total) by mouth once daily.    Cigarette smoker    Wheezing        Health Maintenance   Topic Date Due    HIV Screening  Never done    Colorectal Cancer Screening  Never done    Shingles Vaccine (1 of 2) Never done    RSV Vaccine (Age 60+ and Pregnant patients) (1 - Risk 60-74 years 1-dose series) Never done    COVID-19 Vaccine (3 - 2024-25 season) 09/01/2024    Hemoglobin A1c (Diabetic Prevention Screening)  06/27/2025    Lipid Panel  02/25/2026    TETANUS VACCINE  09/12/2028    Hepatitis C Screening  Completed    Influenza Vaccine   Completed    Pneumococcal Vaccines (Age 50+)  Completed     64-year-old male presenting for routine follow up    - [facility] is outside of patient's insurance network and therefore patient unable to use insurance benefits, discussed at length with patient that he may want to call his insurance to see who is in network that way he will be able to get labs and screening examinations, patient voiced understanding and will look into it, but is currently happy with us here   - actively searching for new jobs that may provide a different insurance, no luck thus far  - discussed with patient to ensure appropriate hydration while cutting grass with his cousin especially during the summer heat  - Due for yearly lab work and lung cancer screening but has been deferred 2/2 insurance not being accepted at [facility]  - discussed the importance of lung cancer screening especially in his case due to his extensive smoking history and current smoking status  - Wheezing to right lower lobe on physical exam, will continue to monitor, patient attributes wheezing to allergy and likely smoking history  - Patient due for repeat colonoscopy at this time, previous was 5/15/2014 and was normal, referral sent to physician in [place] that accepts patient current insurnace plan, patient pending scheduling his appt at beginning of next year but reports finding a place closer to his home that takes his insurance and will get their information for a referral to be sent to them  - Continue current hypertension management as per HPI, bp at goal  - medication refill as above    Follow up in about 6 months (around 12/25/2025) for Routine follow up.    No future appointments.    Dano Newton MD  [facility]

## 2025-06-26 NOTE — PROGRESS NOTES
I have reviewed the note from this visit, and I concur with the plan. Care provided was reasonable and necessary.   Services were provided in an outpatient department of a teaching hospital/facility, and I was immediately available. Recommend inhaler and additional eval if symptoms of wheezing persists.